# Patient Record
Sex: FEMALE | Race: BLACK OR AFRICAN AMERICAN | NOT HISPANIC OR LATINO | Employment: OTHER | URBAN - METROPOLITAN AREA
[De-identification: names, ages, dates, MRNs, and addresses within clinical notes are randomized per-mention and may not be internally consistent; named-entity substitution may affect disease eponyms.]

---

## 2023-02-27 PROBLEM — M54.2 NECK PAIN, MUSCULOSKELETAL: Status: ACTIVE | Noted: 2023-02-27

## 2023-02-27 PROBLEM — H52.03 HYPERMETROPIA OF BOTH EYES: Status: ACTIVE | Noted: 2023-02-27

## 2023-02-27 PROBLEM — L73.2 HIDRADENITIS SUPPURATIVA: Status: ACTIVE | Noted: 2023-02-27

## 2023-02-27 PROBLEM — K21.9 GERD (GASTROESOPHAGEAL REFLUX DISEASE): Status: ACTIVE | Noted: 2023-02-27

## 2023-02-27 PROBLEM — G89.29 CHRONIC DENTAL PAIN: Status: ACTIVE | Noted: 2023-02-27

## 2023-02-27 PROBLEM — G47.33 OSA ON CPAP: Status: ACTIVE | Noted: 2023-02-27

## 2023-02-27 PROBLEM — R10.9 ABDOMINAL PAIN: Status: ACTIVE | Noted: 2023-02-27

## 2023-02-27 PROBLEM — F41.9 ANXIETY: Status: ACTIVE | Noted: 2023-02-27

## 2023-02-27 PROBLEM — E61.1 IRON DEFICIENCY: Status: ACTIVE | Noted: 2023-02-27

## 2023-02-27 PROBLEM — H52.223 MYOPIA OF BOTH EYES WITH REGULAR ASTIGMATISM: Status: ACTIVE | Noted: 2023-02-27

## 2023-02-27 PROBLEM — U07.1 COVID-19 VIRUS INFECTION: Status: ACTIVE | Noted: 2023-02-27

## 2023-02-27 PROBLEM — R51.9 PRESSURE IN HEAD: Status: ACTIVE | Noted: 2023-02-27

## 2023-02-27 PROBLEM — R20.0 NUMBNESS AND TINGLING OF HAND: Status: ACTIVE | Noted: 2023-02-27

## 2023-02-27 PROBLEM — K52.9 GASTROENTERITIS: Status: ACTIVE | Noted: 2023-02-27

## 2023-02-27 PROBLEM — E73.9 LACTOSE INTOLERANCE: Status: ACTIVE | Noted: 2023-02-27

## 2023-02-27 PROBLEM — H43.399 VISUAL FLOATERS: Status: ACTIVE | Noted: 2023-02-27

## 2023-02-27 PROBLEM — J30.9 ALLERGIC RHINITIS: Status: ACTIVE | Noted: 2023-02-27

## 2023-02-27 PROBLEM — R22.0 MOUTH SWELLING: Status: ACTIVE | Noted: 2023-02-27

## 2023-02-27 PROBLEM — G25.81 RESTLESS LEG SYNDROME: Status: ACTIVE | Noted: 2023-02-27

## 2023-02-27 PROBLEM — R20.2 NUMBNESS AND TINGLING OF HAND: Status: ACTIVE | Noted: 2023-02-27

## 2023-02-27 PROBLEM — B37.31 VAGINAL YEAST INFECTION: Status: ACTIVE | Noted: 2023-02-27

## 2023-02-27 PROBLEM — M54.12 CERVICAL RADICULAR PAIN: Status: ACTIVE | Noted: 2023-02-27

## 2023-02-27 PROBLEM — J45.901 ASTHMA EXACERBATION (HHS-HCC): Status: ACTIVE | Noted: 2023-02-27

## 2023-02-27 PROBLEM — R05.9 COUGH: Status: ACTIVE | Noted: 2023-02-27

## 2023-02-27 PROBLEM — H52.13 MYOPIA OF BOTH EYES WITH REGULAR ASTIGMATISM: Status: ACTIVE | Noted: 2023-02-27

## 2023-02-27 PROBLEM — M25.519 PAIN IN THE SHOULDER: Status: ACTIVE | Noted: 2023-02-27

## 2023-02-27 PROBLEM — J01.90 ACUTE SINUSITIS: Status: ACTIVE | Noted: 2023-02-27

## 2023-02-27 PROBLEM — R51.9 CHRONIC HEADACHE: Status: ACTIVE | Noted: 2023-02-27

## 2023-02-27 PROBLEM — M54.9 BACK PAIN: Status: ACTIVE | Noted: 2023-02-27

## 2023-02-27 PROBLEM — T78.40XA ALLERGIES: Status: ACTIVE | Noted: 2023-02-27

## 2023-02-27 PROBLEM — J32.9 CHRONIC RHINOSINUSITIS: Status: ACTIVE | Noted: 2023-02-27

## 2023-02-27 PROBLEM — G89.29 CHRONIC HEADACHE: Status: ACTIVE | Noted: 2023-02-27

## 2023-02-27 PROBLEM — E55.9 VITAMIN D DEFICIENCY: Status: ACTIVE | Noted: 2023-02-27

## 2023-02-27 PROBLEM — M25.361 INSTABILITY OF RIGHT KNEE JOINT: Status: ACTIVE | Noted: 2023-02-27

## 2023-02-27 PROBLEM — J45.909 ASTHMA (HHS-HCC): Status: ACTIVE | Noted: 2023-02-27

## 2023-02-27 PROBLEM — D50.9 ANEMIA, IRON DEFICIENCY: Status: ACTIVE | Noted: 2023-02-27

## 2023-02-27 PROBLEM — H43.393 VITREOUS FLOATERS OF BOTH EYES: Status: ACTIVE | Noted: 2023-02-27

## 2023-02-27 PROBLEM — M79.603 ARM PAIN: Status: ACTIVE | Noted: 2023-02-27

## 2023-02-27 PROBLEM — R00.2 PALPITATIONS: Status: ACTIVE | Noted: 2023-02-27

## 2023-02-27 PROBLEM — R73.03 PREDIABETES: Status: ACTIVE | Noted: 2023-02-27

## 2023-02-27 PROBLEM — S16.1XXA NECK MUSCLE STRAIN: Status: ACTIVE | Noted: 2023-02-27

## 2023-02-27 PROBLEM — R26.81 UNSTEADY GAIT: Status: ACTIVE | Noted: 2023-02-27

## 2023-02-27 PROBLEM — M25.50 MULTIPLE JOINT PAIN: Status: ACTIVE | Noted: 2023-02-27

## 2023-02-27 PROBLEM — E07.9 THYROID DISORDER: Status: ACTIVE | Noted: 2023-02-27

## 2023-02-27 PROBLEM — K08.9 CHRONIC DENTAL PAIN: Status: ACTIVE | Noted: 2023-02-27

## 2023-02-27 PROBLEM — R73.9 HYPERGLYCEMIA: Status: ACTIVE | Noted: 2023-02-27

## 2023-02-27 PROBLEM — R11.2 NAUSEA AND/OR VOMITING: Status: ACTIVE | Noted: 2023-02-27

## 2023-02-27 RX ORDER — MUPIROCIN 20 MG/G
OINTMENT TOPICAL
COMMUNITY
End: 2024-05-22 | Stop reason: SDUPTHER

## 2023-02-27 RX ORDER — LORATADINE 10 MG/1
10 TABLET ORAL DAILY PRN
COMMUNITY
Start: 2021-06-17

## 2023-02-27 RX ORDER — FLUTICASONE PROPIONATE 50 MCG
2 SPRAY, SUSPENSION (ML) NASAL DAILY
COMMUNITY
Start: 2021-06-17 | End: 2024-05-22 | Stop reason: WASHOUT

## 2023-02-27 RX ORDER — IBUPROFEN 800 MG/1
800 TABLET ORAL 2 TIMES DAILY
COMMUNITY
Start: 2016-09-11

## 2023-02-27 RX ORDER — QUINIDINE GLUCONATE 324 MG
TABLET, EXTENDED RELEASE ORAL
COMMUNITY
End: 2024-05-22 | Stop reason: SDUPTHER

## 2023-02-27 RX ORDER — ERGOCALCIFEROL 1.25 MG/1
1.25 CAPSULE ORAL
COMMUNITY
Start: 2021-11-23 | End: 2024-05-22 | Stop reason: SDUPTHER

## 2023-02-27 RX ORDER — GUAIFENESIN 400 MG/1
400 TABLET ORAL EVERY 4 HOURS PRN
COMMUNITY
Start: 2021-06-17

## 2023-02-27 RX ORDER — FERROUS SULFATE 325(65) MG
65 TABLET ORAL DAILY
COMMUNITY
Start: 2016-09-11 | End: 2023-03-08 | Stop reason: SDUPTHER

## 2023-02-27 RX ORDER — AZELASTINE 1 MG/ML
2 SPRAY, METERED NASAL 2 TIMES DAILY
COMMUNITY
Start: 2021-11-23 | End: 2024-05-22 | Stop reason: WASHOUT

## 2023-02-27 RX ORDER — ALBUTEROL SULFATE 90 UG/1
AEROSOL, METERED RESPIRATORY (INHALATION)
COMMUNITY
Start: 2017-01-21 | End: 2024-05-22 | Stop reason: SDUPTHER

## 2023-02-27 RX ORDER — FLUTICASONE PROPIONATE 110 UG/1
2 AEROSOL, METERED RESPIRATORY (INHALATION) 2 TIMES DAILY
COMMUNITY
Start: 2020-05-01 | End: 2024-05-22 | Stop reason: WASHOUT

## 2023-02-27 RX ORDER — PROMETHAZINE HYDROCHLORIDE AND DEXTROMETHORPHAN HYDROBROMIDE 6.25; 15 MG/5ML; MG/5ML
5 SYRUP ORAL EVERY 8 HOURS PRN
COMMUNITY
Start: 2021-12-27 | End: 2024-05-22 | Stop reason: WASHOUT

## 2023-02-27 RX ORDER — FLUTICASONE PROPIONATE AND SALMETEROL 100; 50 UG/1; UG/1
1 POWDER RESPIRATORY (INHALATION) EVERY 12 HOURS
COMMUNITY
Start: 2022-02-23 | End: 2024-05-22 | Stop reason: WASHOUT

## 2023-02-27 RX ORDER — EPINEPHRINE 0.3 MG/.3ML
INJECTION SUBCUTANEOUS
COMMUNITY
Start: 2022-02-22 | End: 2023-03-08 | Stop reason: SDUPTHER

## 2023-03-07 NOTE — PROGRESS NOTES
Subjective    Arron Walton is a 37 y.o. female who presents for Annual Exam.  HPI  She has been more irritable during her cycle during her time of the month   She will turn 38 in April and a referral was placed for ob gyn to discuss  She has a;llergies to shellfish and pollen    An ROS was completed and all systems are negative with the exception of what's noted in the HPI.  She had a sinus [id number] earlier this year   Her sinuses are constantly congested    She does not use flutcasone daily   She has scalp issues , she has scaly skin over the back of the ears     Objective   Physical Exam    Assessment/Plan   Problem List Items Addressed This Visit          Endocrine/Metabolic    Iron deficiency    Relevant Medications    ferrous sulfate 325 (65 Fe) MG tablet    Other Relevant Orders    CBC and Auto Differential    Prediabetes    Relevant Orders    Hemoglobin A1c    Basic metabolic panel    Hemoglobin A1c    Vitamin D deficiency - Primary    Relevant Orders    Vitamin D 25-Hydroxy,Total       Other    Allergies    Relevant Medications    EPINEPHrine 0.3 mg/0.3 mL injection syringe    guaiFENesin (Mucinex) 12 hr tablet 600 mg     Other Visit Diagnoses       Skin lesion of right lower extremity        Relevant Orders    Referral to Dermatology    Premenstrual syndrome        Relevant Orders    Referral to Obstetrics / Gynecology    Breast cancer screening by mammogram        Relevant Orders    BI mammo bilateral screening tomosynthesis    Seborrheic dermatitis        Relevant Medications    hydrocortisone 2.5 % cream    ketoconazole 1 % shampoo

## 2023-03-08 ENCOUNTER — OFFICE VISIT (OUTPATIENT)
Dept: PRIMARY CARE | Facility: CLINIC | Age: 38
End: 2023-03-08
Payer: MEDICAID

## 2023-03-08 VITALS
BODY MASS INDEX: 43.82 KG/M2 | RESPIRATION RATE: 16 BRPM | SYSTOLIC BLOOD PRESSURE: 121 MMHG | HEIGHT: 62 IN | WEIGHT: 238.1 LBS | HEART RATE: 81 BPM | TEMPERATURE: 97.9 F | DIASTOLIC BLOOD PRESSURE: 81 MMHG

## 2023-03-08 DIAGNOSIS — T78.40XA ALLERGY, INITIAL ENCOUNTER: ICD-10-CM

## 2023-03-08 DIAGNOSIS — L21.9 SEBORRHEIC DERMATITIS: ICD-10-CM

## 2023-03-08 DIAGNOSIS — N94.3 PREMENSTRUAL SYNDROME: ICD-10-CM

## 2023-03-08 DIAGNOSIS — L98.9 SKIN LESION OF RIGHT LOWER EXTREMITY: ICD-10-CM

## 2023-03-08 DIAGNOSIS — E55.9 VITAMIN D DEFICIENCY: Primary | ICD-10-CM

## 2023-03-08 DIAGNOSIS — F32.A DEPRESSION, UNSPECIFIED DEPRESSION TYPE: ICD-10-CM

## 2023-03-08 DIAGNOSIS — Z12.31 BREAST CANCER SCREENING BY MAMMOGRAM: ICD-10-CM

## 2023-03-08 DIAGNOSIS — E61.1 IRON DEFICIENCY: ICD-10-CM

## 2023-03-08 DIAGNOSIS — R73.03 PREDIABETES: ICD-10-CM

## 2023-03-08 PROCEDURE — 99215 OFFICE O/P EST HI 40 MIN: CPT | Performed by: NURSE PRACTITIONER

## 2023-03-08 RX ORDER — FERROUS SULFATE 325(65) MG
65 TABLET ORAL 2 TIMES DAILY
Qty: 60 TABLET | Refills: 11 | Status: SHIPPED | OUTPATIENT
Start: 2023-03-08 | End: 2024-03-07

## 2023-03-08 RX ORDER — EPINEPHRINE 0.3 MG/.3ML
1 INJECTION SUBCUTANEOUS AS NEEDED
Qty: 1 ML | Refills: 1 | Status: SHIPPED | OUTPATIENT
Start: 2023-03-08

## 2023-03-08 RX ORDER — GUAIFENESIN 600 MG/1
600 TABLET, EXTENDED RELEASE ORAL ONCE
Status: DISCONTINUED | OUTPATIENT
Start: 2023-03-08 | End: 2023-03-09

## 2023-03-08 RX ORDER — HYDROCORTISONE 25 MG/G
CREAM TOPICAL 2 TIMES DAILY PRN
Qty: 30 G | Refills: 11 | Status: SHIPPED | OUTPATIENT
Start: 2023-03-08 | End: 2024-03-07

## 2023-03-08 ASSESSMENT — PATIENT HEALTH QUESTIONNAIRE - PHQ9
4. FEELING TIRED OR HAVING LITTLE ENERGY: NEARLY EVERY DAY
1. LITTLE INTEREST OR PLEASURE IN DOING THINGS: NOT AT ALL
1. LITTLE INTEREST OR PLEASURE IN DOING THINGS: SEVERAL DAYS
SUM OF ALL RESPONSES TO PHQ9 QUESTIONS 1 AND 2: 2
5. POOR APPETITE OR OVEREATING: MORE THAN HALF THE DAYS
2. FEELING DOWN, DEPRESSED OR HOPELESS: SEVERAL DAYS
8. MOVING OR SPEAKING SO SLOWLY THAT OTHER PEOPLE COULD HAVE NOTICED. OR THE OPPOSITE, BEING SO FIGETY OR RESTLESS THAT YOU HAVE BEEN MOVING AROUND A LOT MORE THAN USUAL: NOT AT ALL
SUM OF ALL RESPONSES TO PHQ9 QUESTIONS 1 & 2: 0
2. FEELING DOWN, DEPRESSED OR HOPELESS: NOT AT ALL
7. TROUBLE CONCENTRATING ON THINGS, SUCH AS READING THE NEWSPAPER OR WATCHING TELEVISION: MORE THAN HALF THE DAYS
6. FEELING BAD ABOUT YOURSELF - OR THAT YOU ARE A FAILURE OR HAVE LET YOURSELF OR YOUR FAMILY DOWN: MORE THAN HALF THE DAYS
10. IF YOU CHECKED OFF ANY PROBLEMS, HOW DIFFICULT HAVE THESE PROBLEMS MADE IT FOR YOU TO DO YOUR WORK, TAKE CARE OF THINGS AT HOME, OR GET ALONG WITH OTHER PEOPLE: EXTREMELY DIFFICULT

## 2023-03-08 NOTE — PATIENT INSTRUCTIONS
Please see the dermatologist  as needed for the lesion on the leg and the scalp issue     Please see ob gyn and get your first mammogram when your breasts are not tender    Use the fluticonase and cetirizine daily and the mucus relief daily to avoid sinus infections and drink drink a lot  of fluids

## 2023-03-17 ENCOUNTER — TELEPHONE (OUTPATIENT)
Dept: PRIMARY CARE | Facility: CLINIC | Age: 38
End: 2023-03-17

## 2024-05-22 ENCOUNTER — LAB (OUTPATIENT)
Dept: LAB | Facility: LAB | Age: 39
End: 2024-05-22
Payer: MEDICAID

## 2024-05-22 ENCOUNTER — OFFICE VISIT (OUTPATIENT)
Dept: PRIMARY CARE | Facility: CLINIC | Age: 39
End: 2024-05-22
Payer: MEDICAID

## 2024-05-22 VITALS
DIASTOLIC BLOOD PRESSURE: 58 MMHG | HEART RATE: 93 BPM | RESPIRATION RATE: 12 BRPM | WEIGHT: 234.1 LBS | OXYGEN SATURATION: 99 % | BODY MASS INDEX: 41.48 KG/M2 | TEMPERATURE: 98.1 F | HEIGHT: 63 IN | SYSTOLIC BLOOD PRESSURE: 108 MMHG

## 2024-05-22 DIAGNOSIS — R39.15 URINARY URGENCY: ICD-10-CM

## 2024-05-22 DIAGNOSIS — R94.6 ABNORMAL THYROID FUNCTION TEST: Primary | ICD-10-CM

## 2024-05-22 DIAGNOSIS — E55.9 VITAMIN D DEFICIENCY: ICD-10-CM

## 2024-05-22 DIAGNOSIS — D50.9 IRON DEFICIENCY ANEMIA, UNSPECIFIED IRON DEFICIENCY ANEMIA TYPE: ICD-10-CM

## 2024-05-22 DIAGNOSIS — J45.30 MILD PERSISTENT ASTHMA, UNSPECIFIED WHETHER COMPLICATED (HHS-HCC): ICD-10-CM

## 2024-05-22 DIAGNOSIS — K90.41 GLUTEN INTOLERANCE: ICD-10-CM

## 2024-05-22 DIAGNOSIS — H61.91 SKIN LESION OF RIGHT EAR: ICD-10-CM

## 2024-05-22 DIAGNOSIS — L21.9 SEBORRHEIC DERMATITIS: ICD-10-CM

## 2024-05-22 DIAGNOSIS — R73.9 HYPERGLYCEMIA: ICD-10-CM

## 2024-05-22 DIAGNOSIS — N30.00 ACUTE CYSTITIS WITHOUT HEMATURIA: ICD-10-CM

## 2024-05-22 DIAGNOSIS — Z00.00 ENCOUNTER FOR ROUTINE HISTORY AND PHYSICAL EXAMINATION OF ADULT: Primary | ICD-10-CM

## 2024-05-22 DIAGNOSIS — E78.2 MIXED HYPERLIPIDEMIA: ICD-10-CM

## 2024-05-22 DIAGNOSIS — R53.83 OTHER FATIGUE: ICD-10-CM

## 2024-05-22 DIAGNOSIS — E56.9 VITAMIN DEFICIENCY: ICD-10-CM

## 2024-05-22 DIAGNOSIS — Z01.419 WELL WOMAN EXAM: ICD-10-CM

## 2024-05-22 DIAGNOSIS — K59.03 DRUG-INDUCED CONSTIPATION: ICD-10-CM

## 2024-05-22 DIAGNOSIS — H10.13 ALLERGIC CONJUNCTIVITIS OF BOTH EYES: ICD-10-CM

## 2024-05-22 DIAGNOSIS — N92.0 MENORRHAGIA WITH REGULAR CYCLE: ICD-10-CM

## 2024-05-22 DIAGNOSIS — N39.0 URINARY TRACT INFECTION WITHOUT HEMATURIA, SITE UNSPECIFIED: ICD-10-CM

## 2024-05-22 LAB
ALBUMIN SERPL BCP-MCNC: 4 G/DL (ref 3.4–5)
ALP SERPL-CCNC: 78 U/L (ref 33–110)
ALT SERPL W P-5'-P-CCNC: 11 U/L (ref 7–45)
ANION GAP SERPL CALC-SCNC: 13 MMOL/L (ref 10–20)
AST SERPL W P-5'-P-CCNC: 13 U/L (ref 9–39)
BILIRUB SERPL-MCNC: 0.3 MG/DL (ref 0–1.2)
BUN SERPL-MCNC: 11 MG/DL (ref 6–23)
CALCIUM SERPL-MCNC: 9.2 MG/DL (ref 8.6–10.6)
CHLORIDE SERPL-SCNC: 104 MMOL/L (ref 98–107)
CO2 SERPL-SCNC: 26 MMOL/L (ref 21–32)
CREAT SERPL-MCNC: 0.93 MG/DL (ref 0.5–1.05)
EGFRCR SERPLBLD CKD-EPI 2021: 79 ML/MIN/1.73M*2
EST. AVERAGE GLUCOSE BLD GHB EST-MCNC: 123 MG/DL
GLUCOSE SERPL-MCNC: 86 MG/DL (ref 74–99)
HBA1C MFR BLD: 5.9 %
IRON SATN MFR SERPL: ABNORMAL %
IRON SERPL-MCNC: 26 UG/DL (ref 35–150)
LDLC SERPL DIRECT ASSAY-MCNC: 99 MG/DL (ref 0–129)
POTASSIUM SERPL-SCNC: 3.9 MMOL/L (ref 3.5–5.3)
PROT SERPL-MCNC: 7.9 G/DL (ref 6.4–8.2)
SODIUM SERPL-SCNC: 139 MMOL/L (ref 136–145)
T4 FREE SERPL-MCNC: 1.17 NG/DL (ref 0.78–1.48)
TIBC SERPL-MCNC: ABNORMAL UG/DL
TSH SERPL-ACNC: 0.38 MIU/L (ref 0.44–3.98)
UIBC SERPL-MCNC: >450 UG/DL (ref 110–370)

## 2024-05-22 PROCEDURE — 83516 IMMUNOASSAY NONANTIBODY: CPT

## 2024-05-22 PROCEDURE — 83036 HEMOGLOBIN GLYCOSYLATED A1C: CPT

## 2024-05-22 PROCEDURE — 36415 COLL VENOUS BLD VENIPUNCTURE: CPT

## 2024-05-22 PROCEDURE — 99396 PREV VISIT EST AGE 40-64: CPT | Performed by: NURSE PRACTITIONER

## 2024-05-22 PROCEDURE — 83550 IRON BINDING TEST: CPT

## 2024-05-22 PROCEDURE — 87186 SC STD MICRODIL/AGAR DIL: CPT

## 2024-05-22 PROCEDURE — 84443 ASSAY THYROID STIM HORMONE: CPT

## 2024-05-22 PROCEDURE — 83540 ASSAY OF IRON: CPT

## 2024-05-22 PROCEDURE — 83721 ASSAY OF BLOOD LIPOPROTEIN: CPT

## 2024-05-22 PROCEDURE — 80053 COMPREHEN METABOLIC PANEL: CPT

## 2024-05-22 PROCEDURE — 84439 ASSAY OF FREE THYROXINE: CPT

## 2024-05-22 PROCEDURE — 99215 OFFICE O/P EST HI 40 MIN: CPT | Performed by: NURSE PRACTITIONER

## 2024-05-22 PROCEDURE — 87086 URINE CULTURE/COLONY COUNT: CPT

## 2024-05-22 PROCEDURE — 1036F TOBACCO NON-USER: CPT | Performed by: NURSE PRACTITIONER

## 2024-05-22 RX ORDER — MUPIROCIN 20 MG/G
OINTMENT TOPICAL
Qty: 30 G | Refills: 1 | Status: SHIPPED | OUTPATIENT
Start: 2024-05-22

## 2024-05-22 RX ORDER — DOCUSATE SODIUM 100 MG/1
CAPSULE, LIQUID FILLED ORAL
Qty: 60 CAPSULE | Refills: 0 | Status: SHIPPED | OUTPATIENT
Start: 2024-05-22

## 2024-05-22 RX ORDER — FERROUS GLUCONATE 324(38)MG
TABLET ORAL
Qty: 180 TABLET | Refills: 1 | Status: SHIPPED | OUTPATIENT
Start: 2024-05-22

## 2024-05-22 RX ORDER — KETOROLAC TROMETHAMINE 5 MG/ML
1 SOLUTION OPHTHALMIC 4 TIMES DAILY
Qty: 10 ML | Refills: 1 | Status: SHIPPED | OUTPATIENT
Start: 2024-05-22 | End: 2024-06-01

## 2024-05-22 RX ORDER — QUINIDINE GLUCONATE 324 MG
TABLET, EXTENDED RELEASE ORAL
Qty: 100 TABLET | Refills: 1 | Status: SHIPPED | OUTPATIENT
Start: 2024-05-22 | End: 2024-05-22 | Stop reason: ENTERED-IN-ERROR

## 2024-05-22 RX ORDER — BISMUTH SUBSALICYLATE 262 MG
1 TABLET,CHEWABLE ORAL DAILY
Qty: 90 TABLET | Refills: 1 | Status: SHIPPED | OUTPATIENT
Start: 2024-05-22 | End: 2025-05-22

## 2024-05-22 RX ORDER — ALBUTEROL SULFATE 90 UG/1
AEROSOL, METERED RESPIRATORY (INHALATION)
Qty: 18 G | Refills: 1 | Status: SHIPPED | OUTPATIENT
Start: 2024-05-22

## 2024-05-22 RX ORDER — TRIAMCINOLONE ACETONIDE 1 MG/G
OINTMENT TOPICAL 2 TIMES DAILY PRN
Qty: 80 G | Refills: 0 | Status: SHIPPED | OUTPATIENT
Start: 2024-05-22 | End: 2024-09-19

## 2024-05-22 RX ORDER — HYDROCORTISONE 25 MG/G
OINTMENT TOPICAL
Qty: 30 G | Refills: 5 | Status: SHIPPED | OUTPATIENT
Start: 2024-05-22

## 2024-05-22 RX ORDER — ERGOCALCIFEROL 1.25 MG/1
1.25 CAPSULE ORAL
Qty: 6 CAPSULE | Refills: 1 | Status: SHIPPED | OUTPATIENT
Start: 2024-05-22

## 2024-05-22 ASSESSMENT — PATIENT HEALTH QUESTIONNAIRE - PHQ9
SUM OF ALL RESPONSES TO PHQ9 QUESTIONS 1 AND 2: 0
2. FEELING DOWN, DEPRESSED OR HOPELESS: NOT AT ALL
1. LITTLE INTEREST OR PLEASURE IN DOING THINGS: NOT AT ALL

## 2024-05-22 NOTE — LETTER
June 11, 2024     Arron Walton  [address] [facility] [address]      Dear Ms. Walton:    Below are the results from your recent visit:    Resulted Orders   Comprehensive Metabolic Panel   Result Value Ref Range    Glucose 86 74 - 99 mg/dL    Sodium 139 136 - 145 mmol/L    Potassium 3.9 3.5 - 5.3 mmol/L    Chloride 104 98 - 107 mmol/L    Bicarbonate 26 21 - 32 mmol/L    Anion Gap 13 10 - 20 mmol/L    Urea Nitrogen 11 6 - 23 mg/dL    Creatinine 0.93 0.50 - 1.05 mg/dL    eGFR 79 >60 mL/min/1.73m*2      Comment:      Calculations of estimated GFR are performed using the 2021 CKD-EPI Study Refit equation without the race variable for the IDMS-Traceable creatinine methods.  https://jasn.asnjournals.org/content/early/2021/09/22/ASN.0782190694    Calcium 9.2 8.6 - 10.6 mg/dL    Albumin 4.0 3.4 - 5.0 g/dL    Alkaline Phosphatase 78 33 - 110 U/L    Total Protein 7.9 6.4 - 8.2 g/dL    AST 13 9 - 39 U/L    Bilirubin, Total 0.3 0.0 - 1.2 mg/dL    ALT 11 7 - 45 U/L      Comment:      Patients treated with Sulfasalazine may generate falsely decreased results for ALT.   Iron and TIBC   Result Value Ref Range    Iron 26 (L) 35 - 150 ug/dL    UIBC >450 (H) 110 - 370 ug/dL    TIBC        Comment:      One or more of the analytes used in this calculation is outside of the analytical measurement range.      % Saturation        Comment:      One or more analytes used in this calculation is outside of the analytical measurement range. Calculation cannot be performed.   Urine Culture   Result Value Ref Range    Urine Culture 20,000 - 80,000 Enterococcus faecalis (A)    Tsh With Reflex To Free T4 If Abnormal   Result Value Ref Range    Thyroid Stimulating Hormone 0.38 (L) 0.44 - 3.98 mIU/L    Narrative    TSH testing is performed using different testing methodology at [facility] than at other [facility]. Direct result comparisons should only be made within the same method.     Hemoglobin A1c    Result Value Ref Range    Hemoglobin A1C 5.9 (H) see below %    Estimated Average Glucose 123 Not Established mg/dL    Narrative    Diagnosis of Diabetes-Adults  Non-Diabetic: < or = 5.6%  Increased risk for developing diabetes: 5.7-6.4%  Diagnostic of diabetes: > or = 6.5%    Monitoring of Diabetes  Age (y)....................... Therapeutic Goal (%)  Adults: >18.........................<7.0  Pediatrics: 13-18...................<7.5  Pediatrics: 7-12....................<8.0  Pediatrics: 0-6..................... 7.5-8.5    American Diabetes Association. Diabetes Care 33(S1), Jan 2010       LDL cholesterol, direct   Result Value Ref Range    LDL, Direct 99 0 - 129 mg/dL    Narrative    Elevated levels of LDL cholesterol are recognized as a key factor in the development of atherosclerosis and CHD. The direct LDL cholesterol test can be used to assess cardiovascular risk and monitor therapy as a follow up to   a lipid profile when triglycerides are significantly elevated.   Tissue Transglutaminase IgA   Result Value Ref Range    Tissue Transglutaminase, IgA <1.0 <15.0 U/mL      Comment:      Celiac disease is unlikely. False negative Tissue  Transglutaminase  Antibody, IgA results can occur in  approximately 10% of patients with celiac disease,  patients already adhering to a gluten-free diet, or  patients with IgA deficiency.   Tissue Transglutaminase IgG   Result Value Ref Range    Tissue Transglutamase IgG <0.82 0.00 - 4.99 FLU      Comment:      INTERPRETIVE INFORMATION: Tissue Transglutaminase Ab, IgG    In individuals with low or deficient IgA, testing for tissue   transglutaminase (tTG) and deamidated Gliadin (DGP) antibodies of   the IgG isotype is performed. Positive tTG and/or DGP IgG antibody   results indicate celiac disease; however, small intestinal biopsy   is required to establish a diagnosis due to the lower accuracy of   these markers, especially in patients without IgA deficiency.  Performed By:  [facility]  [address] [place] [address]  : Som Lopez MD, PhD  CLIA Number: [id number]   Deamidated Gliadin Antibody IgA   Result Value Ref Range    Deamidated Gliadin Antibody IgA <1.0 <15.0 U/mL      Comment:      False negative Deamidated Gliadin Peptide Antibody, IgA   results can occur in patients already adhering to a   gluten-free diet or patients with IgA deficiency.   Tissue Transglutaminase Antibody, IgA is the preferred   test for screening patients with suspected Celiac Disease.           Deamidated Gliadin Antibody IgG   Result Value Ref Range    Deamidated Gliadin Antibody IgG <0.56 0.00 - 4.99 FLU      Comment:      INTERPRETIVE INFORMATION: Deamidated Gliadin Peptide                             (DGP) Ab, IgG    In individuals with low or deficient IgA, testing for tissue   transglutaminase (tTG) and deamidated Gliadin (DGP) antibodies of   the IgG isotype is performed. Positive tTG and/or DGP IgG antibody   results indicate celiac disease; however, small intestinal biopsy   is required to establish a diagnosis due to the lower accuracy of   these markers, especially in patients without IgA deficiency.  Performed By: [facility]  [address] [place] [address]  : Som Lopez MD, PhD  CLIA Number: [id number]   Thyroxine, Free   Result Value Ref Range    Thyroxine, Free 1.17 0.78 - 1.48 ng/dL    Narrative    Thyroxine Free testing is performed using different testing methodology at [facility] than at other [facility]. Direct result comparisons should only be made within the same method.     The test results show that your current treatment is working. Please {Therapies; lab letter directions:65806}. We recommend that you repeat the above test(s) in {Numbers; 1-10:79872} {Time; units w/plural:11}.    If you have any questions or concerns, please don't hesitate to call.          Sincerely,        OUMOU Smart-CNP

## 2024-05-22 NOTE — LETTER
May 22, 2024     Patient: Arron Walton   YOB: 1985   Date of Visit: 5/22/2024       To Whom It May Concern:    Arron Walton was seen in my clinic on 5/22/2024. Please excuse Arron for her absence from work on this day to make the appointment.    If you have any questions or concerns, please don't hesitate to call.         Sincerely,         OUMOU Smart-CNP        CC: No Recipients

## 2024-05-22 NOTE — PATIENT INSTRUCTIONS
Look on utube for pelvic floor exercises and do them  You may have weak pelvic floor medicine   Use truvia or steevia  for a coffee sweetener   You can make your own protein shakes with spinach and some fruits  Use more non vegetables , using beans in the salads makes you more full   Using crock pot you can make soups and stews   Have less starch rice pasta bread and potato   Have more beans protein shakes and green vegetable  I get natures bounty protein and vitamin mix   You can call your insurance to find out which doctors at  take your insurance   Seborrheic dermatitis flares up with seasonal change and with stress you have more yeast on the skin  Eczema is inflammation of the skin related to allergies   Add the  [employer] ruslan to your phone   You can use a passcode to sign in and that is easier   Exercise helps you lose weight   Follow up in 3 months

## 2024-05-22 NOTE — LETTER
May 22, 2024     Arron Walton  [address] [facility] [address]      Dear Ms. Walton:    Below are the results from your recent visit:    Resulted Orders   Comprehensive Metabolic Panel   Result Value Ref Range    Glucose 86 74 - 99 mg/dL    Sodium 139 136 - 145 mmol/L    Potassium 3.9 3.5 - 5.3 mmol/L    Chloride 104 98 - 107 mmol/L    Bicarbonate 26 21 - 32 mmol/L    Anion Gap 13 10 - 20 mmol/L    Urea Nitrogen 11 6 - 23 mg/dL    Creatinine 0.93 0.50 - 1.05 mg/dL    eGFR 79 >60 mL/min/1.73m*2      Comment:      Calculations of estimated GFR are performed using the 2021 CKD-EPI Study Refit equation without the race variable for the IDMS-Traceable creatinine methods.  https://jasn.asnjournals.org/content/early/2021/09/22/ASN.6104046787    Calcium 9.2 8.6 - 10.6 mg/dL    Albumin 4.0 3.4 - 5.0 g/dL    Alkaline Phosphatase 78 33 - 110 U/L    Total Protein 7.9 6.4 - 8.2 g/dL    AST 13 9 - 39 U/L    Bilirubin, Total 0.3 0.0 - 1.2 mg/dL    ALT 11 7 - 45 U/L      Comment:      Patients treated with Sulfasalazine may generate falsely decreased results for ALT.   Iron and TIBC   Result Value Ref Range    Iron 26 (L) 35 - 150 ug/dL    UIBC >450 (H) 110 - 370 ug/dL    TIBC        Comment:      One or more of the analytes used in this calculation is outside of the analytical measurement range.      % Saturation        Comment:      One or more analytes used in this calculation is outside of the analytical measurement range. Calculation cannot be performed.   Tsh With Reflex To Free T4 If Abnormal   Result Value Ref Range    Thyroid Stimulating Hormone 0.38 (L) 0.44 - 3.98 mIU/L    Narrative    TSH testing is performed using different testing methodology at [facility] than at other [facility] hospitals. Direct result comparisons should only be made within the same method.     Hemoglobin A1c   Result Value Ref Range    Hemoglobin A1C 5.9 (H) see below %    Estimated Average Glucose 123 Not  Established mg/dL    Narrative    Diagnosis of Diabetes-Adults  Non-Diabetic: < or = 5.6%  Increased risk for developing diabetes: 5.7-6.4%  Diagnostic of diabetes: > or = 6.5%    Monitoring of Diabetes  Age (y)....................... Therapeutic Goal (%)  Adults: >18.........................<7.0  Pediatrics: 13-18...................<7.5  Pediatrics: 7-12....................<8.0  Pediatrics: 0-6..................... 7.5-8.5    American Diabetes Association. Diabetes Care 33(S1), Jan 2010       LDL cholesterol, direct   Result Value Ref Range    LDL, Direct 99 0 - 129 mg/dL    Narrative    Elevated levels of LDL cholesterol are recognized as a key factor in the development of atherosclerosis and CHD. The direct LDL cholesterol test can be used to assess cardiovascular risk and monitor therapy as a follow up to   a lipid profile when triglycerides are significantly elevated.   Thyroxine, Free   Result Value Ref Range    Thyroxine, Free 1.17 0.78 - 1.48 ng/dL    Narrative    Thyroxine Free testing is performed using different testing methodology at [facility] than at other [facility]. Direct result comparisons should only be made within the same method.     The test results show that your  labs are not all resulted yet . The ones that are show that you are a little deficient in iron . Please take an iron supplement over the counter with a source of vitamin c like a fruit .   Please be prepared to also need to take something for constipaton like one or two stool softeners .  Your aic level shows that you are prediabetic . Please avoid sweetened beverages and have 1/2 of your plate be nonstarchy vegetables 1/4 be protein and 1/4 be some form of starch . You can see a nutritionist if you would like   Please download the [employer] mY chart ruslan to your phone to get online .  Your thyroid level is a little on the low side which means you might be borderline overactive with thyroid activity . The other thyroid  lab is still pending .   If you have any questions or concerns, please don't hesitate to call.         Sincerely,        OUMOU Smart-CNP

## 2024-05-22 NOTE — PROGRESS NOTES
"Subjective   Patient ID: Arron Walton is a 39 y.o. female who presents for Annual Exam (physical).    HPI   The patient has been urinating more frequently in the last 3 months   She has a urgency , and has some control and a little leaks out   She had 5 children she has a tear with one of her children   She has 5 kids at home  , the oldest is 23 and drives , has a 20 year old, has a 17 year old and 15 year old and 7 year old   She starts work at 730 and gets off at 5 pm   Three of her kids are working   Her mother has prediabetes  She gets almond milk and 4   She has a bmi of 41   She works at a whole [employer] and sometimes gets vegetables   She uses sudafed for allergic rhinitis    She takes cranberry azo pills   She is not taking iron she ran out   She has had wheat as an allergy on the food allergy panel   I spent 60 minutes with the patient   Review of Systems Negative except what was mentioned in the HPI       Objective   /58 (Patient Position: Sitting)   Pulse 93   Temp 36.7 °C (98.1 °F)   Resp 12   Ht 1.6 m (5' 3\")   Wt 106 kg (234 lb 1.6 oz)   SpO2 99%   BMI 41.47 kg/m²     Physical Exam  Vitals and nursing note reviewed.   Constitutional:       Appearance: Normal appearance. She is obese.   HENT:      Head: Normocephalic and atraumatic.      Right Ear: Tympanic membrane normal.      Left Ear: Tympanic membrane normal.      Nose: Nose normal.      Mouth/Throat:      Mouth: Mucous membranes are moist.   Eyes:      Extraocular Movements: Extraocular movements intact.      Conjunctiva/sclera: Conjunctivae normal.   Cardiovascular:      Rate and Rhythm: Normal rate and regular rhythm.      Pulses: Normal pulses.      Heart sounds: Normal heart sounds.   Pulmonary:      Effort: Pulmonary effort is normal.      Breath sounds: Normal breath sounds.   Abdominal:      General: Abdomen is flat. Bowel sounds are normal.      Palpations: Abdomen is soft.   Musculoskeletal:         General: Normal " range of motion.      Cervical back: Normal range of motion and neck supple.   Skin:     General: Skin is warm and dry.   Neurological:      General: No focal deficit present.      Mental Status: She is alert and oriented to person, place, and time. Mental status is at baseline.   Psychiatric:         Mood and Affect: Mood normal.         Behavior: Behavior normal.         Thought Content: Thought content normal.         Judgment: Judgment normal.         Assessment/Plan   Problem List Items Addressed This Visit             ICD-10-CM    Asthma ([facility]) - Primary J45.909    Relevant Medications    albuterol 90 mcg/actuation inhaler    Other Relevant Orders    Follow Up In [facility] - Pharmacy    Anemia, iron deficiency D50.9    Relevant Medications    ferrous gluconate 324 (38 Fe) mg tablet    Other Relevant Orders    Iron and TIBC (Completed)    Hyperglycemia R73.9    Relevant Orders    Comprehensive Metabolic Panel (Completed)    Hemoglobin A1c (Completed)    Vitamin D deficiency E55.9    Relevant Medications    ergocalciferol (Vitamin D-2) 1.25 MG (53694 UT) capsule     Other Visit Diagnoses         Codes    Urinary urgency     R39.15    Relevant Orders    Urine Culture    Menorrhagia with regular cycle     N92.0    Skin lesion of right ear     H61.91    Relevant Medications    mupirocin (Bactroban) 2 % ointment    Seborrheic dermatitis     L21.9    Relevant Medications    triamcinolone (Kenalog) 0.1 % ointment    ketoconazole 1 % shampoo    ketoconazole 1 % shampoo    hydrocortisone 2.5 % ointment    Allergic conjunctivitis of both eyes     H10.13    Relevant Medications    ketorolac (Acular) 0.5 % ophthalmic solution    Vitamin deficiency     E56.9    Relevant Medications    multivitamin tablet    Well woman exam     Z01.419    Relevant Orders    Referral to Obstetrics / Gynecology    Drug-induced constipation     K59.03    Relevant Medications    docusate sodium (Colace) 100 mg capsule    Other  fatigue     R53.83    Relevant Orders    Tsh With Reflex To Free T4 If Abnormal (Completed)    Mixed hyperlipidemia     E78.2    Relevant Orders    LDL cholesterol, direct (Completed)    Gluten intolerance     K90.41    Relevant Orders    Celiac Panel

## 2024-05-22 NOTE — LETTER
May 24, 2024     Arron Walton  [address] [facility] [address]      Dear Ms. Walton:    Below are the results from your recent visit:    Resulted Orders   Comprehensive Metabolic Panel   Result Value Ref Range    Glucose 86 74 - 99 mg/dL    Sodium 139 136 - 145 mmol/L    Potassium 3.9 3.5 - 5.3 mmol/L    Chloride 104 98 - 107 mmol/L    Bicarbonate 26 21 - 32 mmol/L    Anion Gap 13 10 - 20 mmol/L    Urea Nitrogen 11 6 - 23 mg/dL    Creatinine 0.93 0.50 - 1.05 mg/dL    eGFR 79 >60 mL/min/1.73m*2      Comment:      Calculations of estimated GFR are performed using the 2021 CKD-EPI Study Refit equation without the race variable for the IDMS-Traceable creatinine methods.  https://jasn.asnjournals.org/content/early/2021/09/22/ASN.9029276900    Calcium 9.2 8.6 - 10.6 mg/dL    Albumin 4.0 3.4 - 5.0 g/dL    Alkaline Phosphatase 78 33 - 110 U/L    Total Protein 7.9 6.4 - 8.2 g/dL    AST 13 9 - 39 U/L    Bilirubin, Total 0.3 0.0 - 1.2 mg/dL    ALT 11 7 - 45 U/L      Comment:      Patients treated with Sulfasalazine may generate falsely decreased results for ALT.   Iron and TIBC   Result Value Ref Range    Iron 26 (L) 35 - 150 ug/dL    UIBC >450 (H) 110 - 370 ug/dL    TIBC        Comment:      One or more of the analytes used in this calculation is outside of the analytical measurement range.      % Saturation        Comment:      One or more analytes used in this calculation is outside of the analytical measurement range. Calculation cannot be performed.   Urine Culture   Result Value Ref Range    Urine Culture 20,000 - 80,000 Enterococcus faecalis (A)    Tsh With Reflex To Free T4 If Abnormal   Result Value Ref Range    Thyroid Stimulating Hormone 0.38 (L) 0.44 - 3.98 mIU/L    Narrative    TSH testing is performed using different testing methodology at [facility] than at other [facility]. Direct result comparisons should only be made within the same method.     Hemoglobin A1c    Result Value Ref Range    Hemoglobin A1C 5.9 (H) see below %    Estimated Average Glucose 123 Not Established mg/dL    Narrative    Diagnosis of Diabetes-Adults  Non-Diabetic: < or = 5.6%  Increased risk for developing diabetes: 5.7-6.4%  Diagnostic of diabetes: > or = 6.5%    Monitoring of Diabetes  Age (y)....................... Therapeutic Goal (%)  Adults: >18.........................<7.0  Pediatrics: 13-18...................<7.5  Pediatrics: 7-12....................<8.0  Pediatrics: 0-6..................... 7.5-8.5    American Diabetes Association. Diabetes Care 33(S1), Jan 2010       LDL cholesterol, direct   Result Value Ref Range    LDL, Direct 99 0 - 129 mg/dL    Narrative    Elevated levels of LDL cholesterol are recognized as a key factor in the development of atherosclerosis and CHD. The direct LDL cholesterol test can be used to assess cardiovascular risk and monitor therapy as a follow up to   a lipid profile when triglycerides are significantly elevated.   Tissue Transglutaminase IgA   Result Value Ref Range    Tissue Transglutaminase, IgA <1.0 <15.0 U/mL      Comment:      Celiac disease is unlikely. False negative Tissue  Transglutaminase  Antibody, IgA results can occur in  approximately 10% of patients with celiac disease,  patients already adhering to a gluten-free diet, or  patients with IgA deficiency.   Tissue Transglutaminase IgG   Result Value Ref Range    Tissue Transglutamase IgG <0.82 0.00 - 4.99 FLU      Comment:      INTERPRETIVE INFORMATION: Tissue Transglutaminase Ab, IgG    In individuals with low or deficient IgA, testing for tissue   transglutaminase (tTG) and deamidated Gliadin (DGP) antibodies of   the IgG isotype is performed. Positive tTG and/or DGP IgG antibody   results indicate celiac disease; however, small intestinal biopsy   is required to establish a diagnosis due to the lower accuracy of   these markers, especially in patients without IgA deficiency.  Performed By:  [facility]  [address] [place] [id number]  : Som Lopez MD, PhD  CLIA Number: [id number]   Deamidated Gliadin Antibody IgA   Result Value Ref Range    Deamidated Gliadin Antibody IgA <1.0 <15.0 U/mL      Comment:      False negative Deamidated Gliadin Peptide Antibody, IgA   results can occur in patients already adhering to a   gluten-free diet or patients with IgA deficiency.   Tissue Transglutaminase Antibody, IgA is the preferred   test for screening patients with suspected Celiac Disease.           Deamidated Gliadin Antibody IgG   Result Value Ref Range    Deamidated Gliadin Antibody IgG <0.56 0.00 - 4.99 FLU      Comment:      INTERPRETIVE INFORMATION: Deamidated Gliadin Peptide                             (DGP) Ab, IgG    In individuals with low or deficient IgA, testing for tissue   transglutaminase (tTG) and deamidated Gliadin (DGP) antibodies of   the IgG isotype is performed. Positive tTG and/or DGP IgG antibody   results indicate celiac disease; however, small intestinal biopsy   is required to establish a diagnosis due to the lower accuracy of   these markers, especially in patients without IgA deficiency.  Performed By: [facility]  [address] [place] [id number]  : Som Lopez MD, PhD  CLIA Number: [id number]   Thyroxine, Free   Result Value Ref Range    Thyroxine, Free 1.17 0.78 - 1.48 ng/dL    Narrative    Thyroxine Free testing is performed using different testing methodology at [facility] than at other [facility]. Direct result comparisons should only be made within the same method.     The test results show that your  gluten test shows that you do not have celiac disease even if your allergy test was  showing something abnormal . Your thyroid test is a little low . You may have thyroid antibodies functioning to cause a change in thyroid function . I have ordered a blood test to evaluate  that . Please return to the lab for this .  It appears that you have a mild urinary tract infection . I have prescribed an antibiotic for you to use . Try to drink a lot of water .     Please add the [employer] chart ruslan to your phone so you can see your own labs and can send messages to your provider .    If you have any questions or concerns, please don't hesitate to call.         Sincerely,        Lilia Latif, OUMOU-CNP

## 2024-05-22 NOTE — LETTER
May 24, 2024     Arron Walton  [address] [facility] [address]      Dear Ms. Walton:    Below are the results from your recent visit:    Resulted Orders   Comprehensive Metabolic Panel   Result Value Ref Range    Glucose 86 74 - 99 mg/dL    Sodium 139 136 - 145 mmol/L    Potassium 3.9 3.5 - 5.3 mmol/L    Chloride 104 98 - 107 mmol/L    Bicarbonate 26 21 - 32 mmol/L    Anion Gap 13 10 - 20 mmol/L    Urea Nitrogen 11 6 - 23 mg/dL    Creatinine 0.93 0.50 - 1.05 mg/dL    eGFR 79 >60 mL/min/1.73m*2      Comment:      Calculations of estimated GFR are performed using the 2021 CKD-EPI Study Refit equation without the race variable for the IDMS-Traceable creatinine methods.  https://jasn.asnjournals.org/content/early/2021/09/22/ASN.6019433596    Calcium 9.2 8.6 - 10.6 mg/dL    Albumin 4.0 3.4 - 5.0 g/dL    Alkaline Phosphatase 78 33 - 110 U/L    Total Protein 7.9 6.4 - 8.2 g/dL    AST 13 9 - 39 U/L    Bilirubin, Total 0.3 0.0 - 1.2 mg/dL    ALT 11 7 - 45 U/L      Comment:      Patients treated with Sulfasalazine may generate falsely decreased results for ALT.   Iron and TIBC   Result Value Ref Range    Iron 26 (L) 35 - 150 ug/dL    UIBC >450 (H) 110 - 370 ug/dL    TIBC        Comment:      One or more of the analytes used in this calculation is outside of the analytical measurement range.      % Saturation        Comment:      One or more analytes used in this calculation is outside of the analytical measurement range. Calculation cannot be performed.   Urine Culture   Result Value Ref Range    Urine Culture 20,000 - 80,000 Enterococcus faecalis (A)    Tsh With Reflex To Free T4 If Abnormal   Result Value Ref Range    Thyroid Stimulating Hormone 0.38 (L) 0.44 - 3.98 mIU/L    Narrative    TSH testing is performed using different testing methodology at [facility] than at other [facility]. Direct result comparisons should only be made within the same method.     Hemoglobin A1c    Result Value Ref Range    Hemoglobin A1C 5.9 (H) see below %    Estimated Average Glucose 123 Not Established mg/dL    Narrative    Diagnosis of Diabetes-Adults  Non-Diabetic: < or = 5.6%  Increased risk for developing diabetes: 5.7-6.4%  Diagnostic of diabetes: > or = 6.5%    Monitoring of Diabetes  Age (y)....................... Therapeutic Goal (%)  Adults: >18.........................<7.0  Pediatrics: 13-18...................<7.5  Pediatrics: 7-12....................<8.0  Pediatrics: 0-6..................... 7.5-8.5    American Diabetes Association. Diabetes Care 33(S1), Jan 2010       LDL cholesterol, direct   Result Value Ref Range    LDL, Direct 99 0 - 129 mg/dL    Narrative    Elevated levels of LDL cholesterol are recognized as a key factor in the development of atherosclerosis and CHD. The direct LDL cholesterol test can be used to assess cardiovascular risk and monitor therapy as a follow up to   a lipid profile when triglycerides are significantly elevated.   Tissue Transglutaminase IgA   Result Value Ref Range    Tissue Transglutaminase, IgA <1.0 <15.0 U/mL      Comment:      Celiac disease is unlikely. False negative Tissue  Transglutaminase  Antibody, IgA results can occur in  approximately 10% of patients with celiac disease,  patients already adhering to a gluten-free diet, or  patients with IgA deficiency.   Tissue Transglutaminase IgG   Result Value Ref Range    Tissue Transglutamase IgG <0.82 0.00 - 4.99 FLU      Comment:      INTERPRETIVE INFORMATION: Tissue Transglutaminase Ab, IgG    In individuals with low or deficient IgA, testing for tissue   transglutaminase (tTG) and deamidated Gliadin (DGP) antibodies of   the IgG isotype is performed. Positive tTG and/or DGP IgG antibody   results indicate celiac disease; however, small intestinal biopsy   is required to establish a diagnosis due to the lower accuracy of   these markers, especially in patients without IgA deficiency.  Performed By:  [facility]  [address] [place] [id number]  : Som Lopez MD, PhD  CLIA Number: [id number]   Deamidated Gliadin Antibody IgA   Result Value Ref Range    Deamidated Gliadin Antibody IgA <1.0 <15.0 U/mL      Comment:      False negative Deamidated Gliadin Peptide Antibody, IgA   results can occur in patients already adhering to a   gluten-free diet or patients with IgA deficiency.   Tissue Transglutaminase Antibody, IgA is the preferred   test for screening patients with suspected Celiac Disease.           Deamidated Gliadin Antibody IgG   Result Value Ref Range    Deamidated Gliadin Antibody IgG <0.56 0.00 - 4.99 FLU      Comment:      INTERPRETIVE INFORMATION: Deamidated Gliadin Peptide                             (DGP) Ab, IgG    In individuals with low or deficient IgA, testing for tissue   transglutaminase (tTG) and deamidated Gliadin (DGP) antibodies of   the IgG isotype is performed. Positive tTG and/or DGP IgG antibody   results indicate celiac disease; however, small intestinal biopsy   is required to establish a diagnosis due to the lower accuracy of   these markers, especially in patients without IgA deficiency.  Performed By: [facility]  [address] [place] [id number]  : Som Lopez MD, PhD  CLIA Number: [id number]   Thyroxine, Free   Result Value Ref Range    Thyroxine, Free 1.17 0.78 - 1.48 ng/dL    Narrative    Thyroxine Free testing is performed using different testing methodology at [facility] than at other [facility]. Direct result comparisons should only be made within the same method.     The test results show that your  .    If you have any questions or concerns, please don't hesitate to call.         Sincerely,        OUMOU Smart-CNP

## 2024-05-23 LAB
GLIADIN PEPTIDE IGA SER IA-ACNC: <1 U/ML
TTG IGA SER IA-ACNC: <1 U/ML

## 2024-05-24 LAB
GLIADIN PEPTIDE IGG SER IA-ACNC: <0.56 FLU (ref 0–4.99)
TTG IGG SER IA-ACNC: <0.82 FLU (ref 0–4.99)

## 2024-05-24 RX ORDER — SULFAMETHOXAZOLE AND TRIMETHOPRIM 800; 160 MG/1; MG/1
1 TABLET ORAL 2 TIMES DAILY
Qty: 10 TABLET | Refills: 0 | Status: SHIPPED | OUTPATIENT
Start: 2024-05-24 | End: 2024-05-29

## 2024-05-25 LAB — BACTERIA UR CULT: ABNORMAL

## 2024-06-11 ENCOUNTER — TELEMEDICINE (OUTPATIENT)
Dept: PHARMACY | Facility: HOSPITAL | Age: 39
End: 2024-06-11
Payer: MEDICAID

## 2024-06-11 DIAGNOSIS — J45.30 MILD PERSISTENT ASTHMA, UNSPECIFIED WHETHER COMPLICATED (HHS-HCC): ICD-10-CM

## 2024-06-11 DIAGNOSIS — R73.03 PREDIABETES: Primary | ICD-10-CM

## 2024-06-11 RX ORDER — NITROFURANTOIN 25; 75 MG/1; MG/1
100 CAPSULE ORAL 2 TIMES DAILY
Qty: 10 CAPSULE | Refills: 0 | Status: SHIPPED | OUTPATIENT
Start: 2024-06-11 | End: 2024-06-16

## 2024-06-12 RX ORDER — DULAGLUTIDE 0.75 MG/.5ML
0.75 INJECTION, SOLUTION SUBCUTANEOUS
Qty: 2 ML | Refills: 1 | Status: SHIPPED | OUTPATIENT
Start: 2024-06-16

## 2024-06-12 NOTE — ASSESSMENT & PLAN NOTE
Discussed that her insurance ([place] Medicaid) will only cover Trulicity. Patient wants to try and improve her A1C and avoid diabetes as well as lose some weight. Discussed need to combine with diet and exercise to achieve results.     No PH/FMH of Pancreatitis, MTC, or MEN II.    START   Trulicity 0.75 mg/0.5 mL - once weekly

## 2024-06-12 NOTE — PROGRESS NOTES
Subjective   Patient ID: Arron Walton is a 39 y.o. female who presents for Asthma, , Prediabetes       Referring Provider: Lilia Latif APRN*    Objective     There were no vitals taken for this visit.     LAB  Lab Results   Component Value Date    BILITOT 0.3 05/22/2024    CALCIUM 9.2 05/22/2024    CO2 26 05/22/2024     05/22/2024    CREATININE 0.93 05/22/2024    GLUCOSE 86 05/22/2024    ALKPHOS 78 05/22/2024    K 3.9 05/22/2024    PROT 7.9 05/22/2024     05/22/2024    AST 13 05/22/2024    ALT 11 05/22/2024    BUN 11 05/22/2024    ANIONGAP 13 05/22/2024    ALBUMIN 4.0 05/22/2024    LIPASE 16 06/02/2021    GFRF >90 01/29/2023     Lab Results   Component Value Date    CHOL 171 03/21/2018    HDL 46.9 03/21/2018     Lab Results   Component Value Date    HGBA1C 5.9 (H) 05/22/2024     The ASCVD Risk score (Padmini DK, et al., 2019) failed to calculate for the following reasons:    Cannot find a previous HDL lab    Cannot find a previous total cholesterol lab    Assessment/Plan   Problem List Items Addressed This Visit       Asthma ([facility]-H[facility])    Prediabetes - Primary     Discussed that her insurance ([place] Medicaid) will only cover Trulicity. Patient wants to try and improve her A1C and avoid diabetes as well as lose some weight. Discussed need to combine with diet and exercise to achieve results.     START   Trulicity 0.75 mg/0.5 mL - once weekly           Trulicity Education:    - Counseled patient on Trulicity MOA, expectations, side effects, duration of therapy, administration, and monitoring parameters.  - Provided detailed dosing and administration counseling to ensure proper technique.   - Reviewed Trulicity titration schedule, starting with 0.75 mg once weekly to 1.5 mg, 3 mg, and if tolerated 4.5 mg.  - Counseled patient on the benefits of GLP-1ra, such as cardiovascular risk reduction, glycemic control, and weight loss potential.  - Reviewed storage requirements of Trulicity when not  in use, and when to administer the medication if a dose is missed.  - Advised patient that they may experience improved satiety after meals and portion sizes of meals may be reduced as doses of Trulicity increase.    Follow-up: 3 weeks     Zoe Pavon PharmD [facility]  Clinical Pharmacist Specialist, Primary Care    Continue all meds under the continuation of care with the referring provider and clinical pharmacy team

## 2024-06-26 ENCOUNTER — INITIAL PRENATAL (OUTPATIENT)
Dept: OBSTETRICS AND GYNECOLOGY | Facility: HOSPITAL | Age: 39
End: 2024-06-26
Payer: MEDICAID

## 2024-06-26 ENCOUNTER — LAB (OUTPATIENT)
Dept: LAB | Facility: LAB | Age: 39
End: 2024-06-26
Payer: MEDICAID

## 2024-06-26 VITALS
BODY MASS INDEX: 41.82 KG/M2 | WEIGHT: 236 LBS | SYSTOLIC BLOOD PRESSURE: 126 MMHG | HEIGHT: 63 IN | DIASTOLIC BLOOD PRESSURE: 77 MMHG

## 2024-06-26 DIAGNOSIS — Z11.3 SCREENING FOR STD (SEXUALLY TRANSMITTED DISEASE): Primary | ICD-10-CM

## 2024-06-26 DIAGNOSIS — Z01.419 WELL WOMAN EXAM: ICD-10-CM

## 2024-06-26 DIAGNOSIS — D50.0 IRON DEFICIENCY ANEMIA DUE TO CHRONIC BLOOD LOSS: ICD-10-CM

## 2024-06-26 DIAGNOSIS — R79.89 ABNORMAL THYROID BLOOD TEST: ICD-10-CM

## 2024-06-26 DIAGNOSIS — E78.2 MIXED HYPERLIPIDEMIA: Primary | ICD-10-CM

## 2024-06-26 DIAGNOSIS — R94.6 ABNORMAL THYROID FUNCTION TEST: ICD-10-CM

## 2024-06-26 DIAGNOSIS — E78.2 MIXED HYPERLIPIDEMIA: ICD-10-CM

## 2024-06-26 LAB
ALBUMIN SERPL BCP-MCNC: 3.9 G/DL (ref 3.4–5)
ALP SERPL-CCNC: 74 U/L (ref 33–110)
ALT SERPL W P-5'-P-CCNC: 9 U/L (ref 7–45)
ANION GAP SERPL CALC-SCNC: 12 MMOL/L (ref 10–20)
AST SERPL W P-5'-P-CCNC: 13 U/L (ref 9–39)
BILIRUB SERPL-MCNC: 0.3 MG/DL (ref 0–1.2)
BUN SERPL-MCNC: 11 MG/DL (ref 6–23)
CALCIUM SERPL-MCNC: 9.3 MG/DL (ref 8.6–10.6)
CHLORIDE SERPL-SCNC: 105 MMOL/L (ref 98–107)
CHOLEST SERPL-MCNC: 151 MG/DL (ref 0–199)
CHOLESTEROL/HDL RATIO: 3.5
CO2 SERPL-SCNC: 26 MMOL/L (ref 21–32)
CREAT SERPL-MCNC: 0.75 MG/DL (ref 0.5–1.05)
EGFRCR SERPLBLD CKD-EPI 2021: >90 ML/MIN/1.73M*2
GLUCOSE SERPL-MCNC: 74 MG/DL (ref 74–99)
HBV SURFACE AG SERPL QL IA: NONREACTIVE
HCV AB SER QL: NONREACTIVE
HDLC SERPL-MCNC: 42.6 MG/DL
HIV 1+2 AB+HIV1 P24 AG SERPL QL IA: NONREACTIVE
IRON SATN MFR SERPL: 3 % (ref 25–45)
IRON SERPL-MCNC: 13 UG/DL (ref 35–150)
LDLC SERPL CALC-MCNC: 92 MG/DL
NON HDL CHOLESTEROL: 108 MG/DL (ref 0–149)
POTASSIUM SERPL-SCNC: 4 MMOL/L (ref 3.5–5.3)
PROT SERPL-MCNC: 7.5 G/DL (ref 6.4–8.2)
SODIUM SERPL-SCNC: 139 MMOL/L (ref 136–145)
THYROPEROXIDASE AB SERPL-ACNC: 58 IU/ML
TIBC SERPL-MCNC: 411 UG/DL (ref 240–445)
TREPONEMA PALLIDUM IGG+IGM AB [PRESENCE] IN SERUM OR PLASMA BY IMMUNOASSAY: NONREACTIVE
TRIGL SERPL-MCNC: 81 MG/DL (ref 0–149)
UIBC SERPL-MCNC: 398 UG/DL (ref 110–370)
VLDL: 16 MG/DL (ref 0–40)

## 2024-06-26 PROCEDURE — 80061 LIPID PANEL: CPT

## 2024-06-26 PROCEDURE — 99386 PREV VISIT NEW AGE 40-64: CPT | Performed by: NURSE PRACTITIONER

## 2024-06-26 PROCEDURE — 80053 COMPREHEN METABOLIC PANEL: CPT

## 2024-06-26 PROCEDURE — 83540 ASSAY OF IRON: CPT

## 2024-06-26 PROCEDURE — 87389 HIV-1 AG W/HIV-1&-2 AB AG IA: CPT

## 2024-06-26 PROCEDURE — 86376 MICROSOMAL ANTIBODY EACH: CPT

## 2024-06-26 PROCEDURE — 86803 HEPATITIS C AB TEST: CPT

## 2024-06-26 PROCEDURE — 87340 HEPATITIS B SURFACE AG IA: CPT

## 2024-06-26 PROCEDURE — 36415 COLL VENOUS BLD VENIPUNCTURE: CPT

## 2024-06-26 PROCEDURE — 86780 TREPONEMA PALLIDUM: CPT

## 2024-06-26 PROCEDURE — 87661 TRICHOMONAS VAGINALIS AMPLIF: CPT | Performed by: NURSE PRACTITIONER

## 2024-06-26 PROCEDURE — 83550 IRON BINDING TEST: CPT

## 2024-06-26 PROCEDURE — 87491 CHLMYD TRACH DNA AMP PROBE: CPT | Performed by: NURSE PRACTITIONER

## 2024-06-26 SDOH — ECONOMIC STABILITY: FOOD INSECURITY: WITHIN THE PAST 12 MONTHS, YOU WORRIED THAT YOUR FOOD WOULD RUN OUT BEFORE YOU GOT MONEY TO BUY MORE.: NEVER TRUE

## 2024-06-26 SDOH — ECONOMIC STABILITY: TRANSPORTATION INSECURITY
IN THE PAST 12 MONTHS, HAS LACK OF TRANSPORTATION KEPT YOU FROM MEETINGS, WORK, OR FROM GETTING THINGS NEEDED FOR DAILY LIVING?: NO

## 2024-06-26 SDOH — ECONOMIC STABILITY: FOOD INSECURITY: WITHIN THE PAST 12 MONTHS, THE FOOD YOU BOUGHT JUST DIDN'T LAST AND YOU DIDN'T HAVE MONEY TO GET MORE.: NEVER TRUE

## 2024-06-26 SDOH — ECONOMIC STABILITY: TRANSPORTATION INSECURITY
IN THE PAST 12 MONTHS, HAS THE LACK OF TRANSPORTATION KEPT YOU FROM MEDICAL APPOINTMENTS OR FROM GETTING MEDICATIONS?: NO

## 2024-06-26 ASSESSMENT — LIFESTYLE VARIABLES
HOW MANY STANDARD DRINKS CONTAINING ALCOHOL DO YOU HAVE ON A TYPICAL DAY: PATIENT DOES NOT DRINK
HOW OFTEN DO YOU HAVE SIX OR MORE DRINKS ON ONE OCCASION: NEVER
HOW OFTEN DO YOU HAVE A DRINK CONTAINING ALCOHOL: NEVER
AUDIT-C TOTAL SCORE: 0
SKIP TO QUESTIONS 9-10: 1

## 2024-06-26 ASSESSMENT — ENCOUNTER SYMPTOMS
OCCASIONAL FEELINGS OF UNSTEADINESS: 0
LOSS OF SENSATION IN FEET: 0
DEPRESSION: 0

## 2024-06-26 ASSESSMENT — PATIENT HEALTH QUESTIONNAIRE - PHQ9
1. LITTLE INTEREST OR PLEASURE IN DOING THINGS: NOT AT ALL
2. FEELING DOWN, DEPRESSED OR HOPELESS: NOT AT ALL
SUM OF ALL RESPONSES TO PHQ9 QUESTIONS 1 AND 2: 0

## 2024-06-26 NOTE — PROGRESS NOTES
Shannon Hernandez, APRN-CNP     Subjective   Arron Walton is a 39 y.o. female who presents for annual exam.  It has been many years since she has had a GYN visit.    She continues to have menstrual cycles and is not using a method of birth control.  We did discuss her options.  Past history of birth control has come with side effects such as weight gain from Depo-Provera and a feeling of ill will when she had a Mirena IUD in place    Past Medical History:   Diagnosis Date    Encounter for gynecological examination (general) (routine) without abnormal findings 04/25/2018    Well woman exam with routine gynecological exam    Encounter for screening for infections with a predominantly sexual mode of transmission 04/25/2018    Routine screening for STI (sexually transmitted infection)    Encounter for screening for malignant neoplasm of vagina     Vaginal Pap smear    Hidradenitis suppurativa     Hidradenitis    Mastodynia     Soreness breast    Other conditions influencing health status     Menstruation    Other specified noninflammatory disorders of vagina     Vaginal dryness    Personal history of diseases of the blood and blood-forming organs and certain disorders involving the immune mechanism 03/28/2016    History of anemia    Personal history of diseases of the skin and subcutaneous tissue     History of furuncle    Personal history of other diseases of the digestive system     History of esophageal reflux    Personal history of other diseases of the nervous system and sense organs     History of sleep apnea    Personal history of other diseases of the respiratory system 02/21/2018    History of allergic rhinitis    Personal history of other mental and behavioral disorders     History of depression    Personal history of other specified conditions     History of shortness of breath    Personal history of other specified conditions 02/23/2017    History of dizziness    Personal history of other specified  "conditions     History of urinary frequency    Personal history of other specified conditions     History of abnormal Pap smear    Type O blood, Rh positive     Blood type O+     Past Surgical History:   Procedure Laterality Date     SECTION, CLASSIC  2017     Section       OB History          5    Para        Term                AB        Living   5         SAB        IAB        Ectopic        Multiple        Live Births   5               No LMP recorded.      Review of Systems   All other systems reviewed and are negative.    Breast: No Complaints   Vaginal: No Complaints        Objective   /77   Ht 1.6 m (5' 3\")   Wt 107 kg (236 lb)   BMI 41.81 kg/m²   Physical Exam  Constitutional:       Appearance: Normal appearance. She is obese.   Genitourinary:      Vulva and rectum normal.      Right Labia: No rash.     Left Labia: No rash.     Vaginal bleeding present.      No vaginal prolapse present.     No vaginal atrophy present.       Right Adnexa: no mass present.     Left Adnexa: no mass present.     Cervix is parous.      Cervix is not nulliparous.      No cervical motion tenderness.      Uterus is not enlarged.   Breasts:     Right: Normal.      Left: Normal.   Cardiovascular:      Rate and Rhythm: Normal rate.      Heart sounds: Normal heart sounds.   Pulmonary:      Effort: Pulmonary effort is normal.      Breath sounds: Normal breath sounds.   Abdominal:      Palpations: Abdomen is soft.   Musculoskeletal:         General: Normal range of motion.      Cervical back: Normal range of motion.   Neurological:      General: No focal deficit present.      Mental Status: She is alert.   Skin:     General: Skin is warm and dry.   Vitals and nursing note reviewed.                 Assessment/Plan   Problem List Items Addressed This Visit    None  Visit Diagnoses         Codes    Screening for STD (sexually transmitted disease)    -  Primary Z11.3    Well woman exam     Z01.419 "    Relevant Orders    HIV 1/2 Antigen/Antibody Screen with Reflex to Confirmation    Hepatitis B Surface Antigen    Hepatitis C Antibody    Syphilis Screen with Reflex    THINPREP PAP TEST    BI mammo bilateral screening tomosynthesis

## 2024-06-28 LAB
C TRACH RRNA SPEC QL NAA+PROBE: NEGATIVE
N GONORRHOEA DNA SPEC QL PROBE+SIG AMP: NEGATIVE
T VAGINALIS RRNA SPEC QL NAA+PROBE: NEGATIVE

## 2024-07-02 ENCOUNTER — APPOINTMENT (OUTPATIENT)
Dept: PHARMACY | Facility: HOSPITAL | Age: 39
End: 2024-07-02
Payer: MEDICAID

## 2024-07-08 ENCOUNTER — TELEPHONE (OUTPATIENT)
Dept: OBSTETRICS AND GYNECOLOGY | Facility: HOSPITAL | Age: 39
End: 2024-07-08
Payer: MEDICAID

## 2024-07-08 LAB
CYTOLOGY CMNT CVX/VAG CYTO-IMP: NORMAL
HPV HR 12 DNA GENITAL QL NAA+PROBE: NEGATIVE
HPV HR GENOTYPES PNL CVX NAA+PROBE: POSITIVE
HPV16 DNA SPEC QL NAA+PROBE: NEGATIVE
HPV18 DNA SPEC QL NAA+PROBE: POSITIVE
LAB AP HPV GENOTYPE QUESTION: YES
LAB AP HPV HR: NORMAL
LAB AP PAP ADDITIONAL TESTS: NORMAL
LABORATORY COMMENT REPORT: NORMAL
LABORATORY COMMENT REPORT: NORMAL
LMP START DATE: NORMAL
PATH REPORT.TOTAL CANCER: NORMAL

## 2024-07-08 NOTE — TELEPHONE ENCOUNTER
----- Message from CULLEN Villafuerte sent at 2024  2:14 PM EDT -----  Patient needs a colpo.  She is not pregnant.  Chart is listed as a NOB chart.  Please contact and get her scheduled    RN placed call to patient to discuss pap results.   Patient identified by name and .  Patient notified that pap came back + for HPV  Patient educated that HPV is a virus that is transmitted sexually and can cause cancer and/or genital warts over time.  Patient educated that she will need a colposcopy  Explained to patient that a colposcopy is an in office procedure that starts out like a normal pap but the provider will use a colposcope to get a better view of the abnormal cells on the cervix. The provider will then take a small biopsy to send to the lab  Patient encouraged to take tylenol or ibuprofen about one hour prior to appointment to help with any cramping  Explained to patient that she may experience some spotting for a few days  Patient scheduled for colpo on  at 3pm during procedure clinic  Encouraged patient to call office with any questions or concerns   Tiarra Baer RN

## 2024-07-24 ENCOUNTER — PROCEDURE VISIT (OUTPATIENT)
Dept: OBSTETRICS AND GYNECOLOGY | Facility: HOSPITAL | Age: 39
End: 2024-07-24
Payer: MEDICAID

## 2024-07-24 VITALS — BODY MASS INDEX: 42.16 KG/M2 | DIASTOLIC BLOOD PRESSURE: 89 MMHG | WEIGHT: 238 LBS | SYSTOLIC BLOOD PRESSURE: 133 MMHG

## 2024-07-24 DIAGNOSIS — R87.810 HUMAN PAPILLOMAVIRUS (HPV) TYPE 18 DNA DETECTED IN CERVICAL SPECIMEN: ICD-10-CM

## 2024-07-24 DIAGNOSIS — Z23 NEED FOR HPV VACCINATION: Primary | ICD-10-CM

## 2024-07-24 PROCEDURE — 57456 ENDOCERV CURETTAGE W/SCOPE: CPT | Mod: GC

## 2024-07-24 PROCEDURE — 88305 TISSUE EXAM BY PATHOLOGIST: CPT | Performed by: PATHOLOGY

## 2024-07-24 PROCEDURE — 99213 OFFICE O/P EST LOW 20 MIN: CPT

## 2024-07-24 PROCEDURE — 57456 ENDOCERV CURETTAGE W/SCOPE: CPT

## 2024-07-24 PROCEDURE — 90651 9VHPV VACCINE 2/3 DOSE IM: CPT | Performed by: OBSTETRICS & GYNECOLOGY

## 2024-07-24 PROCEDURE — 88305 TISSUE EXAM BY PATHOLOGIST: CPT | Mod: TC,SUR

## 2024-07-24 PROCEDURE — 99213 OFFICE O/P EST LOW 20 MIN: CPT | Mod: GC

## 2024-07-24 ASSESSMENT — PAIN SCALES - GENERAL: PAINLEVEL: 0-NO PAIN

## 2024-07-24 ASSESSMENT — ENCOUNTER SYMPTOMS
LOSS OF SENSATION IN FEET: 0
DEPRESSION: 0
OCCASIONAL FEELINGS OF UNSTEADINESS: 0

## 2024-07-24 NOTE — PROGRESS NOTES
Subjective   Patient ID: Arron Walton is a 39 y.o. female who presents for Colposcopy (Patient here for colposcopy/LMP 7-19-24/Last PAP 6-26-24/Patient denies falls/Patient denies pain)    Prior history as follows:  Most recent Pap:  NIL/HPV 18+  ASCUS 2010, otherwise no hx of abnormal pap     Smoking status: never a smoker  Contraception: nothing, abstinent   HPV vaccination: no  HIV status: is HIV negative    Review of Systems     Physical Exam     Colposcopy    Date/Time: 7/24/2024 5:25 PM    Performed by: Amy Reid MD  Authorized by: Vanita Nelson MD    Consent:     Patient questions answered: yes      Risks and benefits of the procedure and its alternatives discussed: yes      Procedural risks discussed:  Bleeding, infection and repeat procedure    Consent obtained:  Verbal and written    Consent given by:  Patient  Pre-procedure:     Prep solution(s): acetic acid    Procedure:     Colposcopy with: endocervical curettage      Cervix visibility: fully visualized      SCJ visibility: fully visualized    Post-procedure:     Patient tolerance of procedure:  Patient tolerated the procedure well with no immediate complications    Instructions and paperwork completed: yes    Comments:      No acetowhite changes noted. ECC completed as patient is >39yo.      Colposcopy - Cervix image     Assessment:  Colposcopic impression: No acetowhite changes       PLAN:   -ECC completed   -Follow up pending results of ECC   -Dose 1 of HPV vaccine completed today     RTC for subsequent HPV vaccine doses, nurse visit     Patient seen and discussed with Dr. Omar Reid MD  PGY1, Obstetrics and Gynecology

## 2024-07-30 LAB
LABORATORY COMMENT REPORT: NORMAL
PATH REPORT.FINAL DX SPEC: NORMAL
PATH REPORT.GROSS SPEC: NORMAL
PATH REPORT.RELEVANT HX SPEC: NORMAL
PATH REPORT.TOTAL CANCER: NORMAL

## 2024-08-02 ENCOUNTER — APPOINTMENT (OUTPATIENT)
Dept: PHARMACY | Facility: HOSPITAL | Age: 39
End: 2024-08-02
Payer: MEDICAID

## 2024-08-06 ENCOUNTER — TELEPHONE (OUTPATIENT)
Dept: OBSTETRICS AND GYNECOLOGY | Facility: HOSPITAL | Age: 39
End: 2024-08-06
Payer: MEDICAID

## 2024-08-06 NOTE — TELEPHONE ENCOUNTER
----- Message from Vanita Nelson sent at 8/5/2024 10:51 PM EDT -----  Plan for repeat diagnostic Pap with cotest in one year.

## 2024-08-07 NOTE — TELEPHONE ENCOUNTER
"Result Communication    Resulted Orders   Surgical Pathology Exam   Result Value Ref Range    Case Report       Surgical Pathology                                Case: [id number]                                  Authorizing Provider:  Vanita Nelson MD         Collected:           07/24/2024 1631              Ordering Location:      [facility]       Received:            07/24/2024 1631                                     [facility]                                                                     Pathologist:           Mario Wolff MD                                                             Specimen:    ENDOCERVIX CURETTINGS                                                                      FINAL DIAGNOSIS         A. Endocervix, curettage:  -Minute fragments of endocervical mucosa and epithelium, with no significant pathologic findings.  -The specimen is composed predominantly of blood.                  By the signature on this report, the individual or group listed as making the Final Interpretation/Diagnosis certifies that they have reviewed this case.       Clinical History       Positive HPV 18, normal PAP      Gross Description       Received in formalin on a brush, labeled with the patient's name and hospital number and \"ECC\", are multiple fragments of mucus, blood and soft tissue aggregating to 1.5 x 0.9 x 0.4 cm.  The specimen is submitted in toto in one cassette.  SMS         4:14 PM    RN placed call to inform patient of her colposcopy results and provider recommendation.   Patient informed that her colposcopy biopsy was negative.   Provider is recommending 1 year repeat pap with HPV.   Scheduling reminder added to the system.   Patient verbalized understanding and denies any further questions or concerns.  Results were successfully communicated with the patient and they acknowledged their understanding.    "

## 2024-08-16 ENCOUNTER — APPOINTMENT (OUTPATIENT)
Dept: PRIMARY CARE | Facility: CLINIC | Age: 39
End: 2024-08-16
Payer: MEDICAID

## 2024-08-28 ENCOUNTER — APPOINTMENT (OUTPATIENT)
Dept: PRIMARY CARE | Facility: CLINIC | Age: 39
End: 2024-08-28
Payer: MEDICAID

## 2024-08-28 NOTE — PROGRESS NOTES
Subjective   Patient ID: Arron Walton is a 39 y.o. female who presents for No chief complaint on file..  Called and left msg , will call later    HPI  error    Review of Systems      Objective   There were no vitals taken for this visit.    Physical Exam   error    Assessment/Plan

## 2024-08-29 ENCOUNTER — APPOINTMENT (OUTPATIENT)
Dept: PHARMACY | Facility: HOSPITAL | Age: 39
End: 2024-08-29
Payer: MEDICAID

## 2024-09-24 ENCOUNTER — CLINICAL SUPPORT (OUTPATIENT)
Dept: OBSTETRICS AND GYNECOLOGY | Facility: HOSPITAL | Age: 39
End: 2024-09-24
Payer: MEDICAID

## 2024-09-24 DIAGNOSIS — Z23 NEED FOR HPV VACCINE: Primary | ICD-10-CM

## 2024-09-24 PROCEDURE — 90651 9VHPV VACCINE 2/3 DOSE IM: CPT

## 2024-09-24 NOTE — PROGRESS NOTES
Patient here for Gardasil injection #2  Patient educated that she will need to return to the clinic  in 6 months for her 3rd   Reviewed s/s of reaction and when to seek medical attention.  Patient verbalized understanding and denies any further questions or concerns  Injection administered in left deltoid  Patient tolerated well

## 2024-11-11 ENCOUNTER — LAB (OUTPATIENT)
Dept: LAB | Facility: LAB | Age: 39
End: 2024-11-11
Payer: MEDICAID

## 2024-11-11 ENCOUNTER — OFFICE VISIT (OUTPATIENT)
Dept: OBSTETRICS AND GYNECOLOGY | Facility: CLINIC | Age: 39
End: 2024-11-11
Payer: MEDICAID

## 2024-11-11 ENCOUNTER — TELEPHONE (OUTPATIENT)
Dept: OBSTETRICS AND GYNECOLOGY | Facility: CLINIC | Age: 39
End: 2024-11-11

## 2024-11-11 VITALS — BODY MASS INDEX: 41.98 KG/M2 | DIASTOLIC BLOOD PRESSURE: 84 MMHG | SYSTOLIC BLOOD PRESSURE: 133 MMHG | WEIGHT: 237 LBS

## 2024-11-11 DIAGNOSIS — N93.9 ABNORMAL UTERINE BLEEDING (AUB): ICD-10-CM

## 2024-11-11 DIAGNOSIS — N93.9 ABNORMAL UTERINE BLEEDING (AUB): Primary | ICD-10-CM

## 2024-11-11 LAB
ERYTHROCYTE [DISTWIDTH] IN BLOOD BY AUTOMATED COUNT: 20.7 % (ref 11.5–14.5)
HCT VFR BLD AUTO: 27.5 % (ref 36–46)
HGB BLD-MCNC: 7.7 G/DL (ref 12–16)
MCH RBC QN AUTO: 19.1 PG (ref 26–34)
MCHC RBC AUTO-ENTMCNC: 28 G/DL (ref 32–36)
MCV RBC AUTO: 68 FL (ref 80–100)
NRBC BLD-RTO: 0 /100 WBCS (ref 0–0)
PLATELET # BLD AUTO: 355 X10*3/UL (ref 150–450)
RBC # BLD AUTO: 4.04 X10*6/UL (ref 4–5.2)
WBC # BLD AUTO: 8.9 X10*3/UL (ref 4.4–11.3)

## 2024-11-11 PROCEDURE — 1036F TOBACCO NON-USER: CPT | Performed by: NURSE PRACTITIONER

## 2024-11-11 PROCEDURE — 99214 OFFICE O/P EST MOD 30 MIN: CPT | Performed by: NURSE PRACTITIONER

## 2024-11-11 PROCEDURE — 36415 COLL VENOUS BLD VENIPUNCTURE: CPT

## 2024-11-11 PROCEDURE — 85027 COMPLETE CBC AUTOMATED: CPT

## 2024-11-11 RX ORDER — NORETHINDRONE 5 MG/1
5 TABLET ORAL DAILY
Qty: 90 TABLET | Refills: 0 | Status: SHIPPED | OUTPATIENT
Start: 2024-11-11 | End: 2025-11-11

## 2024-11-11 ASSESSMENT — ENCOUNTER SYMPTOMS
DEPRESSION: 0
NERVOUS/ANXIOUS: 1
FATIGUE: 1
UNEXPECTED WEIGHT CHANGE: 1
OCCASIONAL FEELINGS OF UNSTEADINESS: 0
CONSTIPATION: 1
LOSS OF SENSATION IN FEET: 0
ABDOMINAL PAIN: 1

## 2024-11-11 ASSESSMENT — PAIN SCALES - GENERAL: PAINLEVEL_OUTOF10: 0-NO PAIN

## 2024-11-11 NOTE — PROGRESS NOTES
Subjective   Patient ID: Arron Walton is a 39 y.o. female who presents for Vaginal Bleeding (Pt here for AUB/Pt states bleeding since 11/1/2024 with heavy to moderate flow with clots present/Pt declines birth control).  Vaginal Bleeding  Associated symptoms include abdominal pain and constipation.   Patient is here today reporting abnormal vaginal bleeding.  And an irregular cycle.  She is wondering if she is perimenopausal because she is also feeling more angry than she has in the past.    She is a G5, P5 and she is open to a future pregnancy if she had a .    Her chief complaint today is that of passing larger blood clots than she has in the past.  She has been passing blood clots with this cycle larger than a $0.25 piece.  She has been bleeding since November 1 although today it is decreasing in intensity.  Chart review shows patient has a history of being anemic and she states she does take iron.  Chart review shows prior pelvic ultrasound June 2021 identifying an enlarged uterus with small uterine fibroid and an endometrial lining 8 mm in thickness.  A possible endometrial polyp was also identified  Patient reports that all methods of birth control she has used in the past were not agreeable to her.  Review of Systems   Constitutional:  Positive for fatigue and unexpected weight change.   Eyes:  Positive for visual disturbance.   Gastrointestinal:  Positive for abdominal pain and constipation.   Genitourinary:  Positive for vaginal bleeding.   Psychiatric/Behavioral:  The patient is nervous/anxious.    All other systems reviewed and are negative.      Objective   Physical Exam deferred as all of appointment was spent in conversation and chart review.    Assessment/Plan   Problem List Items Addressed This Visit    None  Visit Diagnoses         Codes    Abnormal uterine bleeding (AUB)    -  Primary N93.9    Relevant Medications    norethindrone (Aygestin) 5 mg tablet    Other Relevant Orders    CBC     US PELVIS TRANSABDOMINAL WITH TRANSVAGINAL        Patient will follow-up with MD partner after ultrasound is complete.  This will allow further conversation on help with abnormal bleeding.         Shannon Hernandez, OUMOU-CNP 11/11/24 4:28 PM

## 2024-11-11 NOTE — TELEPHONE ENCOUNTER
Patient called in and verified by name and   Patient states her current cycle has been for 11 days, reporting large Blood Clots and changing pad every couple hours  Patient states this is her 3rd cycle like this and this current cycle was 5 days late  Denies s/s of anemia  Patient scheduled for same day appt   Xiao Eduardo RN

## 2024-11-12 ENCOUNTER — HOSPITAL ENCOUNTER (OUTPATIENT)
Dept: RADIOLOGY | Facility: CLINIC | Age: 39
Discharge: HOME | End: 2024-11-12
Payer: MEDICAID

## 2024-11-12 DIAGNOSIS — N93.9 ABNORMAL UTERINE BLEEDING (AUB): ICD-10-CM

## 2024-11-12 PROCEDURE — 76856 US EXAM PELVIC COMPLETE: CPT | Performed by: STUDENT IN AN ORGANIZED HEALTH CARE EDUCATION/TRAINING PROGRAM

## 2024-11-12 PROCEDURE — 76856 US EXAM PELVIC COMPLETE: CPT

## 2024-11-12 PROCEDURE — 76830 TRANSVAGINAL US NON-OB: CPT | Performed by: STUDENT IN AN ORGANIZED HEALTH CARE EDUCATION/TRAINING PROGRAM

## 2024-11-22 ENCOUNTER — APPOINTMENT (OUTPATIENT)
Dept: OBSTETRICS AND GYNECOLOGY | Facility: CLINIC | Age: 39
End: 2024-11-22
Payer: MEDICAID

## 2024-11-22 VITALS
DIASTOLIC BLOOD PRESSURE: 67 MMHG | HEART RATE: 90 BPM | WEIGHT: 237 LBS | BODY MASS INDEX: 41.98 KG/M2 | SYSTOLIC BLOOD PRESSURE: 151 MMHG

## 2024-11-22 DIAGNOSIS — N93.9 ABNORMAL UTERINE BLEEDING (AUB): Primary | ICD-10-CM

## 2024-11-22 PROCEDURE — 99213 OFFICE O/P EST LOW 20 MIN: CPT | Performed by: STUDENT IN AN ORGANIZED HEALTH CARE EDUCATION/TRAINING PROGRAM

## 2024-11-22 PROCEDURE — 1036F TOBACCO NON-USER: CPT | Performed by: STUDENT IN AN ORGANIZED HEALTH CARE EDUCATION/TRAINING PROGRAM

## 2024-11-22 RX ORDER — TRANEXAMIC ACID 650 MG/1
TABLET ORAL
Qty: 90 TABLET | Refills: 3 | Status: SHIPPED | OUTPATIENT
Start: 2024-11-22

## 2024-11-23 NOTE — PROGRESS NOTES
Subjective   Patient ID: Arron Walton is a 39 y.o. female who presents for FOLLOW UP (Test  results).  Patient of and arthritis.  Patient has heavy menstrual bleeding but is not yet completed childbearing.  She does not yet have a partner.        Review of Systems   Genitourinary:  Positive for menstrual problem.   All other systems reviewed and are negative.      Objective   Physical Exam  Vitals reviewed.   Constitutional:       General: She is not in acute distress.     Appearance: She is not ill-appearing.   Pulmonary:      Effort: Pulmonary effort is normal.   Skin:     Coloration: Skin is not pale.   Neurological:      Mental Status: She is alert.         Assessment/Plan   Diagnoses and all orders for this visit:  Abnormal uterine bleeding (AUB)  -     tranexamic acid (Lysteda) 650 mg tablet tablet; Take 2 tablets three times per day for up to 5 days during menses each month    Patient here for evaluation of heavy menstrual bleeding.  Patient would like to maintain fertility we will start patient on cyclic tranexamic acid and follow-up symptoms in 3 months.       Ba Waller MD 11/22/24 8:19 PM

## 2024-12-17 ENCOUNTER — APPOINTMENT (OUTPATIENT)
Dept: OPHTHALMOLOGY | Facility: CLINIC | Age: 39
End: 2024-12-17
Payer: MEDICAID

## 2024-12-17 DIAGNOSIS — R73.03 PREDIABETES: Primary | ICD-10-CM

## 2024-12-17 DIAGNOSIS — H52.03 HYPEROPIA, BILATERAL: ICD-10-CM

## 2024-12-17 PROCEDURE — 92015 DETERMINE REFRACTIVE STATE: CPT | Performed by: OPHTHALMOLOGY

## 2024-12-17 PROCEDURE — 92014 COMPRE OPH EXAM EST PT 1/>: CPT | Performed by: OPHTHALMOLOGY

## 2024-12-17 ASSESSMENT — REFRACTION_MANIFEST
OS_SPHERE: +0.50
OS_ADD: +1.00
OD_AXIS: 100
OS_CYLINDER: -0.50
OS_AXIS: 075
OD_CYLINDER: -1.75
OD_SPHERE: +0.75
OD_ADD: +1.00

## 2024-12-17 ASSESSMENT — CONF VISUAL FIELD
OD_INFERIOR_TEMPORAL_RESTRICTION: 0
OD_NORMAL: 1
OS_NORMAL: 1
OS_INFERIOR_TEMPORAL_RESTRICTION: 0
OD_SUPERIOR_TEMPORAL_RESTRICTION: 0
METHOD: COUNTING FINGERS
OS_INFERIOR_NASAL_RESTRICTION: 0
OS_SUPERIOR_TEMPORAL_RESTRICTION: 0
OD_SUPERIOR_NASAL_RESTRICTION: 0
OD_INFERIOR_NASAL_RESTRICTION: 0
OS_SUPERIOR_NASAL_RESTRICTION: 0

## 2024-12-17 ASSESSMENT — ENCOUNTER SYMPTOMS
RESPIRATORY NEGATIVE: 0
ENDOCRINE NEGATIVE: 0
PSYCHIATRIC NEGATIVE: 0
MUSCULOSKELETAL NEGATIVE: 0
NEUROLOGICAL NEGATIVE: 0
CARDIOVASCULAR NEGATIVE: 0
GASTROINTESTINAL NEGATIVE: 0
CONSTITUTIONAL NEGATIVE: 0
ALLERGIC/IMMUNOLOGIC NEGATIVE: 0
EYES NEGATIVE: 0
HEMATOLOGIC/LYMPHATIC NEGATIVE: 0

## 2024-12-17 ASSESSMENT — SLIT LAMP EXAM - LIDS
COMMENTS: GOOD POSITION, FALSE LASHES
COMMENTS: GOOD POSITION, FALSE LASHES

## 2024-12-17 ASSESSMENT — VISUAL ACUITY
OS_SC: 20/20
METHOD: SNELLEN - LINEAR
OD_SC: 20/30
OD_PH_SC: 20/20

## 2024-12-17 ASSESSMENT — EXTERNAL EXAM - RIGHT EYE: OD_EXAM: NORMAL

## 2024-12-17 ASSESSMENT — TONOMETRY
IOP_METHOD: GOLDMANN APPLANATION
OD_IOP_MMHG: 12
OS_IOP_MMHG: 14

## 2024-12-17 ASSESSMENT — CUP TO DISC RATIO
OD_RATIO: .3
OS_RATIO: .3

## 2024-12-17 ASSESSMENT — EXTERNAL EXAM - LEFT EYE: OS_EXAM: NORMAL

## 2024-12-17 NOTE — PROGRESS NOTES
Assessment/Plan   Diagnoses and all orders for this visit:  Prediabetes  no retinopathy observed on exam today od/os, pt ed to continue good BGlc, blood pressure and lipid control, rtc with any changes in vision, otherwise monitor 1 year   Hyperopia, bilateral  Glasses prescription given to patient today

## 2025-01-24 ENCOUNTER — CLINICAL SUPPORT (OUTPATIENT)
Dept: OBSTETRICS AND GYNECOLOGY | Facility: HOSPITAL | Age: 40
End: 2025-01-24
Payer: MEDICAID

## 2025-01-24 VITALS
HEART RATE: 86 BPM | WEIGHT: 241 LBS | SYSTOLIC BLOOD PRESSURE: 130 MMHG | DIASTOLIC BLOOD PRESSURE: 90 MMHG | HEIGHT: 62 IN | BODY MASS INDEX: 44.35 KG/M2

## 2025-01-24 DIAGNOSIS — Z23 NEED FOR HPV VACCINATION: ICD-10-CM

## 2025-01-24 PROCEDURE — 90651 9VHPV VACCINE 2/3 DOSE IM: CPT | Performed by: OBSTETRICS & GYNECOLOGY

## 2025-01-24 NOTE — PROGRESS NOTES
Patient here for Gardasil injection #3  Reviewed s/s of reaction and when to seek medical attention.  Patient verbalized understanding and denies any further questions or concerns  Injection administered in right deltoid.  Patient tolerated well

## 2025-02-25 ENCOUNTER — APPOINTMENT (OUTPATIENT)
Dept: OBSTETRICS AND GYNECOLOGY | Facility: CLINIC | Age: 40
End: 2025-02-25
Payer: MEDICAID

## 2025-03-07 ENCOUNTER — APPOINTMENT (OUTPATIENT)
Dept: OBSTETRICS AND GYNECOLOGY | Facility: CLINIC | Age: 40
End: 2025-03-07
Payer: MEDICAID

## 2025-03-25 ENCOUNTER — APPOINTMENT (OUTPATIENT)
Dept: OBSTETRICS AND GYNECOLOGY | Facility: CLINIC | Age: 40
End: 2025-03-25
Payer: MEDICAID

## 2025-05-20 ENCOUNTER — TELEPHONE (OUTPATIENT)
Dept: DERMATOLOGY | Facility: CLINIC | Age: 40
End: 2025-05-20
Payer: MEDICAID

## 2025-05-20 NOTE — TELEPHONE ENCOUNTER
Pt left message that she is upset she waited 6 months to see Dr Oliveira for June 2025 and her appt was canceled by our office and rescheduled for Aug of 2025 she really needs appt sooner , please callback

## 2025-05-30 ENCOUNTER — APPOINTMENT (OUTPATIENT)
Dept: PRIMARY CARE | Facility: CLINIC | Age: 40
End: 2025-05-30
Payer: MEDICAID

## 2025-05-30 VITALS
TEMPERATURE: 97 F | BODY MASS INDEX: 44.9 KG/M2 | RESPIRATION RATE: 14 BRPM | SYSTOLIC BLOOD PRESSURE: 122 MMHG | HEIGHT: 62 IN | DIASTOLIC BLOOD PRESSURE: 80 MMHG | WEIGHT: 244 LBS

## 2025-05-30 DIAGNOSIS — R30.0 DYSURIA: ICD-10-CM

## 2025-05-30 DIAGNOSIS — D64.9 ANEMIA, UNSPECIFIED TYPE: ICD-10-CM

## 2025-05-30 DIAGNOSIS — F32.A DEPRESSION, UNSPECIFIED DEPRESSION TYPE: ICD-10-CM

## 2025-05-30 DIAGNOSIS — T78.40XA ALLERGY, INITIAL ENCOUNTER: ICD-10-CM

## 2025-05-30 DIAGNOSIS — Z12.31 ENCOUNTER FOR SCREENING MAMMOGRAM FOR MALIGNANT NEOPLASM OF BREAST: ICD-10-CM

## 2025-05-30 DIAGNOSIS — R79.89 LOW VITAMIN D LEVEL: ICD-10-CM

## 2025-05-30 DIAGNOSIS — R52 PAIN: Primary | ICD-10-CM

## 2025-05-30 DIAGNOSIS — L21.9 SEBORRHEIC DERMATITIS: ICD-10-CM

## 2025-05-30 DIAGNOSIS — R73.03 PREDIABETES: ICD-10-CM

## 2025-05-30 DIAGNOSIS — Z76.89 ENCOUNTER TO ESTABLISH CARE: ICD-10-CM

## 2025-05-30 PROBLEM — N64.4 BREAST PAIN: Status: ACTIVE | Noted: 2025-05-30

## 2025-05-30 PROBLEM — Z86.59 HISTORY OF DEPRESSION: Status: ACTIVE | Noted: 2025-05-30

## 2025-05-30 PROBLEM — N39.0 URINARY TRACT INFECTION: Status: ACTIVE | Noted: 2025-05-30

## 2025-05-30 PROBLEM — Z91.013 SHELLFISH ALLERGY: Status: ACTIVE | Noted: 2025-05-30

## 2025-05-30 PROBLEM — A74.9 INFECTION DUE TO CHLAMYDIA SPECIES: Status: ACTIVE | Noted: 2025-05-30

## 2025-05-30 PROBLEM — R07.0 PAIN IN THROAT: Status: ACTIVE | Noted: 2025-05-30

## 2025-05-30 PROCEDURE — 99214 OFFICE O/P EST MOD 30 MIN: CPT

## 2025-05-30 PROCEDURE — 3008F BODY MASS INDEX DOCD: CPT

## 2025-05-30 RX ORDER — EPINEPHRINE 0.3 MG/.3ML
1 INJECTION SUBCUTANEOUS AS NEEDED
Qty: 1 ML | Refills: 1 | Status: SHIPPED | OUTPATIENT
Start: 2025-05-30

## 2025-05-30 RX ORDER — DULAGLUTIDE 0.75 MG/.5ML
0.75 INJECTION, SOLUTION SUBCUTANEOUS
Qty: 2 ML | Refills: 1 | Status: SHIPPED | OUTPATIENT
Start: 2025-06-01

## 2025-05-30 ASSESSMENT — ANXIETY QUESTIONNAIRES
3. WORRYING TOO MUCH ABOUT DIFFERENT THINGS: NEARLY EVERY DAY
5. BEING SO RESTLESS THAT IT IS HARD TO SIT STILL: MORE THAN HALF THE DAYS
2. NOT BEING ABLE TO STOP OR CONTROL WORRYING: NEARLY EVERY DAY
4. TROUBLE RELAXING: NEARLY EVERY DAY
6. BECOMING EASILY ANNOYED OR IRRITABLE: NEARLY EVERY DAY
7. FEELING AFRAID AS IF SOMETHING AWFUL MIGHT HAPPEN: NEARLY EVERY DAY
GAD7 TOTAL SCORE: 20
IF YOU CHECKED OFF ANY PROBLEMS ON THIS QUESTIONNAIRE, HOW DIFFICULT HAVE THESE PROBLEMS MADE IT FOR YOU TO DO YOUR WORK, TAKE CARE OF THINGS AT HOME, OR GET ALONG WITH OTHER PEOPLE: EXTREMELY DIFFICULT
1. FEELING NERVOUS, ANXIOUS, OR ON EDGE: NEARLY EVERY DAY

## 2025-05-30 NOTE — ASSESSMENT & PLAN NOTE
Orders:    EPINEPHrine 0.3 mg/0.3 mL injection syringe; Inject 0.3 mL (0.3 mg) into the muscle if needed for anaphylaxis. As Directed

## 2025-05-30 NOTE — PROGRESS NOTES
Subjective   Patient ID: Arron Walton is a 40 y.o. female who presents to Establish Care.  Pap next month   Mammogram ordered   HPI  The patient is here today to establish care, she complains of anxiety and depression. She denies SI or HI. She c/o right hand pain after making a sudden movement. She says that the pain has been going on for about one month but denies numbness or tingling.  She also c/o urinary frequency ,  she denies  burning with urinating.     ROS  General: no fever or night sweats, no change in weight  Eyes: no vision disturbance  ENT: no hearing loss, no hoarseness, no mouth lesions, no sore throat, and no dysphagia  CV: no chest pain, no palpitations, no lower extremity edema  Resp: no shortness of breath, no cough  GI: no abdominal pain, no change in bowel habits  : urinary frequency  MSK: right hand pain , no back pain   Skin; no rashes or new/changed skin lesions  Neuro: no headache, no difficulty walking        Visit Vitals  /80 (Patient Position: Sitting)   Temp 36.1 °C (97 °F)   Resp 14          Objective   Physical Exam  Vitals reviewed.   Constitutional:       Appearance: Normal appearance. She is obese.   HENT:      Head: Normocephalic.   Cardiovascular:      Rate and Rhythm: Normal rate and regular rhythm.      Pulses: Normal pulses.      Heart sounds: Normal heart sounds.   Pulmonary:      Effort: Pulmonary effort is normal.      Breath sounds: Normal breath sounds.   Abdominal:      General: Bowel sounds are normal.      Palpations: Abdomen is soft.   Musculoskeletal:         General: Tenderness present. No swelling. Normal range of motion.      Cervical back: Normal range of motion.      Comments: Right hand pain    Skin:     General: Skin is warm and dry.   Neurological:      General: No focal deficit present.      Mental Status: She is alert and oriented to person, place, and time.      Sensory: No sensory deficit.   Psychiatric:         Mood and Affect: Mood normal.            Assessment/Plan     Assessment & Plan  Prediabetes  Orders:    dulaglutide (Trulicity) 0.75 mg/0.5 mL pen injector; Inject 0.75 mg under the skin 1 (one) time per week.    Allergy, initial encounter  Orders:    EPINEPHrine 0.3 mg/0.3 mL injection syringe; Inject 0.3 mL (0.3 mg) into the muscle if needed for anaphylaxis. As Directed    Seborrheic dermatitis  Orders:    ketoconazole 1 % shampoo; Apply 120 mL topically if needed (as needed for itchy scal;p). Shampoo daily, leave on for 10  minutes, then rinse.    Pain  Orders:    XR hand right 1-2 views; Future    Depression, unspecified depression type  Orders:    Referral to Psychiatry; Future    Follow Up In [facility] - PCP - Established; Future    Encounter for screening mammogram for malignant neoplasm of breast  Orders:    BI mammo bilateral screening tomosynthesis; Future    Encounter to establish care  Orders:    Hemoglobin A1C; Future    Lipid Panel; Future    Tsh With Reflex To Free T4 If Abnormal; Future    Anemia, unspecified type  Orders:    CBC; Future    Follow Up In [facility] - PCP - Established; Future    Low vitamin D level  Orders:    Comprehensive metabolic panel; Future    Vitamin D 25-Hydroxy,Total (for eval of Vitamin D levels); Future    Follow Up In [facility] - PCP - Established; Future    Dysuria  Orders:    Urinalysis with Reflex Culture and Microscopic; Future           OUMOU Fried-CNP

## 2025-05-30 NOTE — ASSESSMENT & PLAN NOTE
Orders:    dulaglutide (Trulicity) 0.75 mg/0.5 mL pen injector; Inject 0.75 mg under the skin 1 (one) time per week.

## 2025-06-01 LAB
25(OH)D3+25(OH)D2 SERPL-MCNC: 25 NG/ML (ref 30–100)
ALBUMIN SERPL-MCNC: 4 G/DL (ref 3.6–5.1)
ALP SERPL-CCNC: 72 U/L (ref 31–125)
ALT SERPL-CCNC: 11 U/L (ref 6–29)
ANION GAP SERPL CALCULATED.4IONS-SCNC: 7 MMOL/L (CALC) (ref 7–17)
APPEARANCE UR: CLEAR
AST SERPL-CCNC: 13 U/L (ref 10–30)
BACTERIA #/AREA URNS HPF: ABNORMAL /HPF
BACTERIA UR CULT: ABNORMAL
BILIRUB SERPL-MCNC: 0.4 MG/DL (ref 0.2–1.2)
BILIRUB UR QL STRIP: NEGATIVE
BUN SERPL-MCNC: 12 MG/DL (ref 7–25)
CALCIUM SERPL-MCNC: 9 MG/DL (ref 8.6–10.2)
CHLORIDE SERPL-SCNC: 105 MMOL/L (ref 98–110)
CHOLEST SERPL-MCNC: 148 MG/DL
CHOLEST/HDLC SERPL: 3.4 (CALC)
CO2 SERPL-SCNC: 25 MMOL/L (ref 20–32)
COLOR UR: ABNORMAL
CREAT SERPL-MCNC: 0.8 MG/DL (ref 0.5–0.99)
EGFRCR SERPLBLD CKD-EPI 2021: 94 ML/MIN/1.73M2
ERYTHROCYTE [DISTWIDTH] IN BLOOD BY AUTOMATED COUNT: 20.9 % (ref 11–15)
EST. AVERAGE GLUCOSE BLD GHB EST-MCNC: 108 MG/DL
EST. AVERAGE GLUCOSE BLD GHB EST-SCNC: 6 MMOL/L
GLUCOSE SERPL-MCNC: 110 MG/DL (ref 65–99)
GLUCOSE UR QL STRIP: NEGATIVE
HBA1C MFR BLD: 5.4 %
HCT VFR BLD AUTO: 36.2 % (ref 35–45)
HDLC SERPL-MCNC: 43 MG/DL
HGB BLD-MCNC: 10.1 G/DL (ref 11.7–15.5)
HGB UR QL STRIP: ABNORMAL
HYALINE CASTS #/AREA URNS LPF: ABNORMAL /LPF
KETONES UR QL STRIP: ABNORMAL
LDLC SERPL CALC-MCNC: 86 MG/DL (CALC)
LEUKOCYTE ESTERASE UR QL STRIP: NEGATIVE
MCH RBC QN AUTO: 20.9 PG (ref 27–33)
MCHC RBC AUTO-ENTMCNC: 27.9 G/DL (ref 32–36)
MCV RBC AUTO: 74.8 FL (ref 80–100)
NITRITE UR QL STRIP: NEGATIVE
NONHDLC SERPL-MCNC: 105 MG/DL (CALC)
PH UR STRIP: 6.5 [PH] (ref 5–8)
PLATELET # BLD AUTO: 295 THOUSAND/UL (ref 140–400)
PMV BLD REES-ECKER: ABNORMAL FL
POTASSIUM SERPL-SCNC: 4.7 MMOL/L (ref 3.5–5.3)
PROT SERPL-MCNC: 7.1 G/DL (ref 6.1–8.1)
PROT UR QL STRIP: ABNORMAL
RBC # BLD AUTO: 4.84 MILLION/UL (ref 3.8–5.1)
RBC #/AREA URNS HPF: ABNORMAL /HPF
SERVICE CMNT-IMP: ABNORMAL
SODIUM SERPL-SCNC: 137 MMOL/L (ref 135–146)
SP GR UR STRIP: 1.03 (ref 1–1.03)
SQUAMOUS #/AREA URNS HPF: ABNORMAL /HPF
TRIGL SERPL-MCNC: 92 MG/DL
TSH SERPL-ACNC: 0.45 MIU/L
WBC # BLD AUTO: 6.6 THOUSAND/UL (ref 3.8–10.8)
WBC #/AREA URNS HPF: ABNORMAL /HPF

## 2025-06-09 ENCOUNTER — TELEPHONE (OUTPATIENT)
Dept: PRIMARY CARE | Facility: CLINIC | Age: 40
End: 2025-06-09
Payer: MEDICAID

## 2025-06-09 DIAGNOSIS — R30.0 DYSURIA: ICD-10-CM

## 2025-06-09 DIAGNOSIS — E66.813 CLASS 3 SEVERE OBESITY WITHOUT SERIOUS COMORBIDITY WITH BODY MASS INDEX (BMI) OF 40.0 TO 44.9 IN ADULT, UNSPECIFIED OBESITY TYPE: Primary | ICD-10-CM

## 2025-06-09 NOTE — TELEPHONE ENCOUNTER
Called to check on patient she states that she still has slight urinary burning . She denies CVA tendnerness,  fevers or chills . Recent UA  showed epithelial cells few bacteria. Informed the patient that I put another order in to retest urine. Instructed patient on how to give clean catch urine, Also HDL 43, encouraged lifestyle modifications of less fried foods, less fast foods more , more baked foods, eat more green leafy vegetables . Pt verbalized understanding of plan. Vitamin D level 25 instructed patient to take OTC Vitamin D 3 2000units daily.

## 2025-06-15 LAB
APPEARANCE UR: ABNORMAL
BACTERIA #/AREA URNS HPF: ABNORMAL /HPF
BACTERIA UR CULT: ABNORMAL
BACTERIA UR CULT: ABNORMAL
BILIRUB UR QL STRIP: NEGATIVE
COLOR UR: ABNORMAL
GLUCOSE UR QL STRIP: NEGATIVE
HGB UR QL STRIP: ABNORMAL
HYALINE CASTS #/AREA URNS LPF: ABNORMAL /LPF
KETONES UR QL STRIP: ABNORMAL
LEUKOCYTE ESTERASE UR QL STRIP: ABNORMAL
NITRITE UR QL STRIP: NEGATIVE
PH UR STRIP: 6 [PH] (ref 5–8)
PROT UR QL STRIP: ABNORMAL
RBC #/AREA URNS HPF: ABNORMAL /HPF
SERVICE CMNT-IMP: ABNORMAL
SP GR UR STRIP: 1.02 (ref 1–1.03)
SQUAMOUS #/AREA URNS HPF: ABNORMAL /HPF
WBC #/AREA URNS HPF: ABNORMAL /HPF

## 2025-06-17 ENCOUNTER — TELEPHONE (OUTPATIENT)
Dept: PRIMARY CARE | Facility: CLINIC | Age: 40
End: 2025-06-17
Payer: MEDICAID

## 2025-06-17 DIAGNOSIS — R30.0 DYSURIA: Primary | ICD-10-CM

## 2025-06-17 NOTE — TELEPHONE ENCOUNTER
Patient called back. States she was on her menstrual cycle and still has some UTI symptoms. Patient would like antibiotics.

## 2025-06-18 ENCOUNTER — TELEPHONE (OUTPATIENT)
Dept: PRIMARY CARE | Facility: CLINIC | Age: 40
End: 2025-06-18

## 2025-06-18 RX ORDER — NITROFURANTOIN 25; 75 MG/1; MG/1
100 CAPSULE ORAL 2 TIMES DAILY
Qty: 10 CAPSULE | Refills: 0 | Status: SHIPPED | OUTPATIENT
Start: 2025-06-18 | End: 2025-06-23

## 2025-06-18 NOTE — TELEPHONE ENCOUNTER
Patients UA + culture was not indicated patient still having UTI sx. 5 day course of Macrobid sent to the ED office staff will call and inform patient

## 2025-06-26 ENCOUNTER — APPOINTMENT (OUTPATIENT)
Dept: DERMATOLOGY | Facility: CLINIC | Age: 40
End: 2025-06-26
Payer: MEDICAID

## 2025-07-25 ENCOUNTER — OFFICE VISIT (OUTPATIENT)
Dept: OBSTETRICS AND GYNECOLOGY | Facility: HOSPITAL | Age: 40
End: 2025-07-25
Payer: MEDICAID

## 2025-07-25 VITALS
BODY MASS INDEX: 44.9 KG/M2 | HEART RATE: 94 BPM | HEIGHT: 62 IN | WEIGHT: 244 LBS | SYSTOLIC BLOOD PRESSURE: 129 MMHG | DIASTOLIC BLOOD PRESSURE: 84 MMHG

## 2025-07-25 DIAGNOSIS — Z01.419 WELL WOMAN EXAM WITH ROUTINE GYNECOLOGICAL EXAM: Primary | ICD-10-CM

## 2025-07-25 PROCEDURE — 1036F TOBACCO NON-USER: CPT | Performed by: NURSE PRACTITIONER

## 2025-07-25 PROCEDURE — 99396 PREV VISIT EST AGE 40-64: CPT | Performed by: NURSE PRACTITIONER

## 2025-07-25 PROCEDURE — 3008F BODY MASS INDEX DOCD: CPT | Performed by: NURSE PRACTITIONER

## 2025-07-25 SDOH — ECONOMIC STABILITY: FOOD INSECURITY: WITHIN THE PAST 12 MONTHS, YOU WORRIED THAT YOUR FOOD WOULD RUN OUT BEFORE YOU GOT MONEY TO BUY MORE.: NEVER TRUE

## 2025-07-25 SDOH — ECONOMIC STABILITY: FOOD INSECURITY: WITHIN THE PAST 12 MONTHS, THE FOOD YOU BOUGHT JUST DIDN'T LAST AND YOU DIDN'T HAVE MONEY TO GET MORE.: NEVER TRUE

## 2025-07-25 ASSESSMENT — LIFESTYLE VARIABLES
HOW OFTEN DO YOU HAVE A DRINK CONTAINING ALCOHOL: NEVER
HOW MANY STANDARD DRINKS CONTAINING ALCOHOL DO YOU HAVE ON A TYPICAL DAY: PATIENT DOES NOT DRINK
HOW OFTEN DO YOU HAVE SIX OR MORE DRINKS ON ONE OCCASION: NEVER
SKIP TO QUESTIONS 9-10: 1
AUDIT-C TOTAL SCORE: 0

## 2025-07-25 ASSESSMENT — PATIENT HEALTH QUESTIONNAIRE - PHQ9
1. LITTLE INTEREST OR PLEASURE IN DOING THINGS: NOT AT ALL
SUM OF ALL RESPONSES TO PHQ9 QUESTIONS 1 & 2: 0
2. FEELING DOWN, DEPRESSED OR HOPELESS: NOT AT ALL

## 2025-07-25 ASSESSMENT — PAIN SCALES - GENERAL: PAINLEVEL_OUTOF10: 0-NO PAIN

## 2025-07-25 NOTE — PROGRESS NOTES
"Shannon Hernandez, APRN-CNP     Subjective   Arron Walton is a 40 y.o. female who presents for annual exam.   40-year-old G5, P5 here for an annual exam.    Last Pap smear was abnormal followed by a colposcopy.  She needs a repeat Pap smear today.  Not currently sexually active for over a year.    No specific gynecological complaints or concerns    She and her family are going on a cruise in September.  Medical History[1]  Surgical History[2]    OB History          5    Para   5    Term   4       1    AB        Living   5         SAB        IAB        Ectopic        Multiple        Live Births   5               Patient's last menstrual period was 2025 (exact date).      Review of Systems   All other systems reviewed and are negative.    Breast: No Complaints   Vaginal: No Complaints        Objective   /84   Pulse 94   Ht 1.575 m (5' 2\")   Wt 111 kg (244 lb)   LMP 2025 (Exact Date)   BMI 44.63 kg/m²   Physical Exam  Constitutional:       Appearance: She is obese.   Genitourinary:      Right Labia: No rash.     Left Labia: No rash.     No vaginal discharge.      No vaginal prolapse present.       Right Adnexa: no mass present.     Left Adnexa: no mass present.     Cervix is parous.      No cervical motion tenderness.   Breasts:     Right: Normal.      Left: Normal.   HENT:      Head: Normocephalic.     Cardiovascular:      Rate and Rhythm: Normal rate.   Pulmonary:      Effort: Pulmonary effort is normal.   Abdominal:      Palpations: Abdomen is soft.     Musculoskeletal:         General: Normal range of motion.     Neurological:      Mental Status: She is alert.     Psychiatric:         Mood and Affect: Mood normal.                   Assessment/Plan   Problem List Items Addressed This Visit    None  Visit Diagnoses         Codes      Well woman exam with routine gynecological exam    -  Primary Z01.419    Relevant Orders    THINPREP PAP TEST (>30)               [1]   Past " Medical History:  Diagnosis Date    Encounter for gynecological examination (general) (routine) without abnormal findings 2018    Well woman exam with routine gynecological exam    Encounter for screening for infections with a predominantly sexual mode of transmission 2018    Routine screening for STI (sexually transmitted infection)    Encounter for screening for malignant neoplasm of vagina     Vaginal Pap smear    Hidradenitis suppurativa     Hidradenitis    Mastodynia     Soreness breast    Other conditions influencing health status     Menstruation    Other specified noninflammatory disorders of vagina     Vaginal dryness    Personal history of diseases of the blood and blood-forming organs and certain disorders involving the immune mechanism 2016    History of anemia    Personal history of diseases of the skin and subcutaneous tissue     History of furuncle    Personal history of other diseases of the digestive system     History of esophageal reflux    Personal history of other diseases of the nervous system and sense organs     History of sleep apnea    Personal history of other diseases of the respiratory system 2018    History of allergic rhinitis    Personal history of other mental and behavioral disorders     History of depression    Personal history of other specified conditions     History of shortness of breath    Personal history of other specified conditions 2017    History of dizziness    Personal history of other specified conditions     History of urinary frequency    Personal history of other specified conditions     History of abnormal Pap smear    Type O blood, Rh positive     Blood type O+   [2]   Past Surgical History:  Procedure Laterality Date     SECTION, LOW TRANSVERSE  2016     Section

## 2025-07-29 ENCOUNTER — RESULTS FOLLOW-UP (OUTPATIENT)
Dept: OBSTETRICS AND GYNECOLOGY | Facility: CLINIC | Age: 40
End: 2025-07-29
Payer: MEDICAID

## 2025-08-12 ENCOUNTER — APPOINTMENT (OUTPATIENT)
Dept: DERMATOLOGY | Facility: CLINIC | Age: 40
End: 2025-08-12
Payer: MEDICAID

## 2025-08-12 LAB
CYTOLOGY CMNT CVX/VAG CYTO-IMP: NORMAL
HPV HR 12 DNA GENITAL QL NAA+PROBE: NEGATIVE
HPV HR GENOTYPES PNL CVX NAA+PROBE: POSITIVE
HPV16 DNA SPEC QL NAA+PROBE: NEGATIVE
HPV18 DNA SPEC QL NAA+PROBE: POSITIVE
LAB AP HPV GENOTYPE QUESTION: YES
LAB AP HPV HR: NORMAL
LAB AP PAP ADDITIONAL TESTS: NORMAL
LABORATORY COMMENT REPORT: NORMAL
LMP START DATE: NORMAL
PATH REPORT.TOTAL CANCER: NORMAL

## 2025-08-13 ENCOUNTER — TELEPHONE (OUTPATIENT)
Dept: OBSTETRICS AND GYNECOLOGY | Facility: HOSPITAL | Age: 40
End: 2025-08-13
Payer: MEDICAID

## 2025-08-28 ENCOUNTER — APPOINTMENT (OUTPATIENT)
Dept: BEHAVIORAL HEALTH | Facility: CLINIC | Age: 40
End: 2025-08-28
Payer: MEDICAID

## 2025-08-28 ENCOUNTER — HOSPITAL ENCOUNTER (EMERGENCY)
Facility: HOSPITAL | Age: 40
Discharge: HOME | End: 2025-08-28
Payer: MEDICAID

## 2025-08-28 VITALS
BODY MASS INDEX: 44.35 KG/M2 | HEIGHT: 62 IN | OXYGEN SATURATION: 98 % | DIASTOLIC BLOOD PRESSURE: 87 MMHG | SYSTOLIC BLOOD PRESSURE: 136 MMHG | RESPIRATION RATE: 18 BRPM | WEIGHT: 241 LBS | HEART RATE: 74 BPM | TEMPERATURE: 98.6 F

## 2025-08-28 DIAGNOSIS — T19.2XXA RETAINED TAMPON, INITIAL ENCOUNTER: Primary | ICD-10-CM
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
## 2025-08-28 PROCEDURE — 99283 EMERGENCY DEPT VISIT LOW MDM: CPT

## 2025-08-28 PROCEDURE — 99282 EMERGENCY DEPT VISIT SF MDM: CPT

## 2025-09-02 ENCOUNTER — APPOINTMENT (OUTPATIENT)
Dept: PRIMARY CARE | Facility: CLINIC | Age: 40
End: 2025-09-02
Payer: MEDICAID

## 2025-09-04 ENCOUNTER — APPOINTMENT (OUTPATIENT)
Dept: PRIMARY CARE | Facility: CLINIC | Age: 40
End: 2025-09-04
Payer: MEDICAID

## 2025-10-10 ENCOUNTER — APPOINTMENT (OUTPATIENT)
Dept: PRIMARY CARE | Facility: CLINIC | Age: 40
End: 2025-10-10
Payer: MEDICAID

## 2025-12-22 ENCOUNTER — APPOINTMENT (OUTPATIENT)
Dept: OPHTHALMOLOGY | Facility: CLINIC | Age: 40
End: 2025-12-22
Payer: MEDICAID